# Patient Record
Sex: MALE | Race: OTHER | NOT HISPANIC OR LATINO | URBAN - METROPOLITAN AREA
[De-identification: names, ages, dates, MRNs, and addresses within clinical notes are randomized per-mention and may not be internally consistent; named-entity substitution may affect disease eponyms.]

---

## 2017-06-12 ENCOUNTER — INPATIENT (INPATIENT)
Facility: HOSPITAL | Age: 82
LOS: 3 days | Discharge: HOME | DRG: 281 | End: 2017-06-16
Attending: HOSPITALIST | Admitting: INTERNAL MEDICINE
Payer: MEDICARE

## 2017-06-12 VITALS
RESPIRATION RATE: 16 BRPM | HEIGHT: 66 IN | OXYGEN SATURATION: 94 % | DIASTOLIC BLOOD PRESSURE: 79 MMHG | TEMPERATURE: 98 F | HEART RATE: 83 BPM | WEIGHT: 153.66 LBS | SYSTOLIC BLOOD PRESSURE: 135 MMHG

## 2017-06-12 DIAGNOSIS — I27.82 CHRONIC PULMONARY EMBOLISM: ICD-10-CM

## 2017-06-12 DIAGNOSIS — I10 ESSENTIAL (PRIMARY) HYPERTENSION: ICD-10-CM

## 2017-06-12 DIAGNOSIS — Z98.89 OTHER SPECIFIED POSTPROCEDURAL STATES: Chronic | ICD-10-CM

## 2017-06-12 DIAGNOSIS — I21.4 NON-ST ELEVATION (NSTEMI) MYOCARDIAL INFARCTION: ICD-10-CM

## 2017-06-12 DIAGNOSIS — Z90.49 ACQUIRED ABSENCE OF OTHER SPECIFIED PARTS OF DIGESTIVE TRACT: Chronic | ICD-10-CM

## 2017-06-12 DIAGNOSIS — N40.0 BENIGN PROSTATIC HYPERPLASIA WITHOUT LOWER URINARY TRACT SYMPTOMS: ICD-10-CM

## 2017-06-12 DIAGNOSIS — I25.708 ATHEROSCLEROSIS OF CORONARY ARTERY BYPASS GRAFT(S), UNSPECIFIED, WITH OTHER FORMS OF ANGINA PECTORIS: ICD-10-CM

## 2017-06-12 DIAGNOSIS — J43.9 EMPHYSEMA, UNSPECIFIED: ICD-10-CM

## 2017-06-12 DIAGNOSIS — Z29.9 ENCOUNTER FOR PROPHYLACTIC MEASURES, UNSPECIFIED: ICD-10-CM

## 2017-06-12 DIAGNOSIS — E11.8 TYPE 2 DIABETES MELLITUS WITH UNSPECIFIED COMPLICATIONS: ICD-10-CM

## 2017-06-12 LAB
ALBUMIN SERPL ELPH-MCNC: 3.7 G/DL — SIGNIFICANT CHANGE UP (ref 3.3–5)
ALP SERPL-CCNC: 48 U/L — SIGNIFICANT CHANGE UP (ref 40–120)
ALT FLD-CCNC: 17 U/L RC — SIGNIFICANT CHANGE UP (ref 10–45)
ANION GAP SERPL CALC-SCNC: 16 MMOL/L — SIGNIFICANT CHANGE UP (ref 5–17)
APTT BLD: 52.7 SEC — HIGH (ref 27.5–37.4)
AST SERPL-CCNC: 23 U/L — SIGNIFICANT CHANGE UP (ref 10–40)
BASOPHILS # BLD AUTO: 0 K/UL — SIGNIFICANT CHANGE UP (ref 0–0.2)
BASOPHILS NFR BLD AUTO: 0.3 % — SIGNIFICANT CHANGE UP (ref 0–2)
BILIRUB SERPL-MCNC: 0.8 MG/DL — SIGNIFICANT CHANGE UP (ref 0.2–1.2)
BUN SERPL-MCNC: 12 MG/DL — SIGNIFICANT CHANGE UP (ref 7–23)
CALCIUM SERPL-MCNC: 8.9 MG/DL — SIGNIFICANT CHANGE UP (ref 8.4–10.5)
CHLORIDE SERPL-SCNC: 105 MMOL/L — SIGNIFICANT CHANGE UP (ref 96–108)
CO2 SERPL-SCNC: 23 MMOL/L — SIGNIFICANT CHANGE UP (ref 22–31)
CREAT SERPL-MCNC: 0.85 MG/DL — SIGNIFICANT CHANGE UP (ref 0.5–1.3)
EOSINOPHIL # BLD AUTO: 0.1 K/UL — SIGNIFICANT CHANGE UP (ref 0–0.5)
EOSINOPHIL NFR BLD AUTO: 2.2 % — SIGNIFICANT CHANGE UP (ref 0–6)
GLUCOSE SERPL-MCNC: 101 MG/DL — HIGH (ref 70–99)
HCT VFR BLD CALC: 37.6 % — LOW (ref 39–50)
HGB BLD-MCNC: 13 G/DL — SIGNIFICANT CHANGE UP (ref 13–17)
INR BLD: 1.19 RATIO — HIGH (ref 0.88–1.16)
LYMPHOCYTES # BLD AUTO: 1.6 K/UL — SIGNIFICANT CHANGE UP (ref 1–3.3)
LYMPHOCYTES # BLD AUTO: 25.2 % — SIGNIFICANT CHANGE UP (ref 13–44)
MCHC RBC-ENTMCNC: 32.3 PG — SIGNIFICANT CHANGE UP (ref 27–34)
MCHC RBC-ENTMCNC: 34.5 GM/DL — SIGNIFICANT CHANGE UP (ref 32–36)
MCV RBC AUTO: 93.6 FL — SIGNIFICANT CHANGE UP (ref 80–100)
MONOCYTES # BLD AUTO: 0.6 K/UL — SIGNIFICANT CHANGE UP (ref 0–0.9)
MONOCYTES NFR BLD AUTO: 9.8 % — SIGNIFICANT CHANGE UP (ref 2–14)
NEUTROPHILS # BLD AUTO: 4 K/UL — SIGNIFICANT CHANGE UP (ref 1.8–7.4)
NEUTROPHILS NFR BLD AUTO: 62.4 % — SIGNIFICANT CHANGE UP (ref 43–77)
PLATELET # BLD AUTO: 213 K/UL — SIGNIFICANT CHANGE UP (ref 150–400)
POTASSIUM SERPL-MCNC: 3.4 MMOL/L — LOW (ref 3.5–5.3)
POTASSIUM SERPL-SCNC: 3.4 MMOL/L — LOW (ref 3.5–5.3)
PROT SERPL-MCNC: 6.6 G/DL — SIGNIFICANT CHANGE UP (ref 6–8.3)
PROTHROM AB SERPL-ACNC: 13 SEC — HIGH (ref 9.8–12.7)
RBC # BLD: 4.02 M/UL — LOW (ref 4.2–5.8)
RBC # FLD: 12.8 % — SIGNIFICANT CHANGE UP (ref 10.3–14.5)
SODIUM SERPL-SCNC: 144 MMOL/L — SIGNIFICANT CHANGE UP (ref 135–145)
TROPONIN T SERPL-MCNC: 0.06 NG/ML — SIGNIFICANT CHANGE UP (ref 0–0.06)
WBC # BLD: 6.4 K/UL — SIGNIFICANT CHANGE UP (ref 3.8–10.5)
WBC # FLD AUTO: 6.4 K/UL — SIGNIFICANT CHANGE UP (ref 3.8–10.5)

## 2017-06-12 PROCEDURE — 99223 1ST HOSP IP/OBS HIGH 75: CPT

## 2017-06-12 RX ORDER — MIRTAZAPINE 45 MG/1
15 TABLET, ORALLY DISINTEGRATING ORAL AT BEDTIME
Qty: 0 | Refills: 0 | Status: DISCONTINUED | OUTPATIENT
Start: 2017-06-12 | End: 2017-06-16

## 2017-06-12 RX ORDER — LOSARTAN POTASSIUM 100 MG/1
25 TABLET, FILM COATED ORAL DAILY
Qty: 0 | Refills: 0 | Status: DISCONTINUED | OUTPATIENT
Start: 2017-06-12 | End: 2017-06-16

## 2017-06-12 RX ORDER — DEXTROSE 50 % IN WATER 50 %
25 SYRINGE (ML) INTRAVENOUS ONCE
Qty: 0 | Refills: 0 | Status: DISCONTINUED | OUTPATIENT
Start: 2017-06-12 | End: 2017-06-16

## 2017-06-12 RX ORDER — METOPROLOL TARTRATE 50 MG
25 TABLET ORAL DAILY
Qty: 0 | Refills: 0 | Status: DISCONTINUED | OUTPATIENT
Start: 2017-06-12 | End: 2017-06-16

## 2017-06-12 RX ORDER — GLUCAGON INJECTION, SOLUTION 0.5 MG/.1ML
1 INJECTION, SOLUTION SUBCUTANEOUS ONCE
Qty: 0 | Refills: 0 | Status: DISCONTINUED | OUTPATIENT
Start: 2017-06-12 | End: 2017-06-16

## 2017-06-12 RX ORDER — ATORVASTATIN CALCIUM 80 MG/1
40 TABLET, FILM COATED ORAL AT BEDTIME
Qty: 0 | Refills: 0 | Status: DISCONTINUED | OUTPATIENT
Start: 2017-06-12 | End: 2017-06-16

## 2017-06-12 RX ORDER — TAMSULOSIN HYDROCHLORIDE 0.4 MG/1
0.4 CAPSULE ORAL AT BEDTIME
Qty: 0 | Refills: 0 | Status: DISCONTINUED | OUTPATIENT
Start: 2017-06-12 | End: 2017-06-16

## 2017-06-12 RX ORDER — LUBIPROSTONE 24 UG/1
24 CAPSULE, GELATIN COATED ORAL
Qty: 0 | Refills: 0 | Status: DISCONTINUED | OUTPATIENT
Start: 2017-06-12 | End: 2017-06-16

## 2017-06-12 RX ORDER — TIOTROPIUM BROMIDE 18 UG/1
1 CAPSULE ORAL; RESPIRATORY (INHALATION)
Qty: 0 | Refills: 0 | COMMUNITY

## 2017-06-12 RX ORDER — LEVOTHYROXINE SODIUM 125 MCG
112 TABLET ORAL DAILY
Qty: 0 | Refills: 0 | Status: DISCONTINUED | OUTPATIENT
Start: 2017-06-12 | End: 2017-06-16

## 2017-06-12 RX ORDER — SODIUM CHLORIDE 9 MG/ML
1000 INJECTION, SOLUTION INTRAVENOUS
Qty: 0 | Refills: 0 | Status: DISCONTINUED | OUTPATIENT
Start: 2017-06-12 | End: 2017-06-16

## 2017-06-12 RX ORDER — ISOSORBIDE MONONITRATE 60 MG/1
60 TABLET, EXTENDED RELEASE ORAL DAILY
Qty: 0 | Refills: 0 | Status: DISCONTINUED | OUTPATIENT
Start: 2017-06-12 | End: 2017-06-16

## 2017-06-12 RX ORDER — DOCUSATE SODIUM 100 MG
100 CAPSULE ORAL THREE TIMES A DAY
Qty: 0 | Refills: 0 | Status: DISCONTINUED | OUTPATIENT
Start: 2017-06-12 | End: 2017-06-16

## 2017-06-12 RX ORDER — INSULIN LISPRO 100/ML
VIAL (ML) SUBCUTANEOUS AT BEDTIME
Qty: 0 | Refills: 0 | Status: DISCONTINUED | OUTPATIENT
Start: 2017-06-12 | End: 2017-06-16

## 2017-06-12 RX ORDER — ASPIRIN/CALCIUM CARB/MAGNESIUM 324 MG
81 TABLET ORAL DAILY
Qty: 0 | Refills: 0 | Status: DISCONTINUED | OUTPATIENT
Start: 2017-06-12 | End: 2017-06-16

## 2017-06-12 RX ORDER — HEPARIN SODIUM 5000 [USP'U]/ML
4100 INJECTION INTRAVENOUS; SUBCUTANEOUS EVERY 6 HOURS
Qty: 0 | Refills: 0 | Status: DISCONTINUED | OUTPATIENT
Start: 2017-06-12 | End: 2017-06-14

## 2017-06-12 RX ORDER — INSULIN LISPRO 100/ML
VIAL (ML) SUBCUTANEOUS
Qty: 0 | Refills: 0 | Status: DISCONTINUED | OUTPATIENT
Start: 2017-06-12 | End: 2017-06-16

## 2017-06-12 RX ORDER — DEXTROSE 50 % IN WATER 50 %
1 SYRINGE (ML) INTRAVENOUS ONCE
Qty: 0 | Refills: 0 | Status: DISCONTINUED | OUTPATIENT
Start: 2017-06-12 | End: 2017-06-16

## 2017-06-12 RX ORDER — DEXTROSE 50 % IN WATER 50 %
12.5 SYRINGE (ML) INTRAVENOUS ONCE
Qty: 0 | Refills: 0 | Status: DISCONTINUED | OUTPATIENT
Start: 2017-06-12 | End: 2017-06-16

## 2017-06-12 RX ORDER — HEPARIN SODIUM 5000 [USP'U]/ML
INJECTION INTRAVENOUS; SUBCUTANEOUS
Qty: 25000 | Refills: 0 | Status: DISCONTINUED | OUTPATIENT
Start: 2017-06-12 | End: 2017-06-14

## 2017-06-12 RX ORDER — CLOPIDOGREL BISULFATE 75 MG/1
75 TABLET, FILM COATED ORAL DAILY
Qty: 0 | Refills: 0 | Status: DISCONTINUED | OUTPATIENT
Start: 2017-06-12 | End: 2017-06-15

## 2017-06-12 RX ADMIN — ATORVASTATIN CALCIUM 40 MILLIGRAM(S): 80 TABLET, FILM COATED ORAL at 23:14

## 2017-06-12 RX ADMIN — HEPARIN SODIUM 800 UNIT(S)/HR: 5000 INJECTION INTRAVENOUS; SUBCUTANEOUS at 22:10

## 2017-06-12 RX ADMIN — LUBIPROSTONE 24 MICROGRAM(S): 24 CAPSULE, GELATIN COATED ORAL at 23:12

## 2017-06-12 RX ADMIN — Medication 100 MILLIGRAM(S): at 23:16

## 2017-06-12 RX ADMIN — MIRTAZAPINE 15 MILLIGRAM(S): 45 TABLET, ORALLY DISINTEGRATING ORAL at 23:12
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
## 2017-06-12 NOTE — H&P ADULT - FAMILY HISTORY
Father  Still living? Unknown  Family history of premature CAD, Age at diagnosis: Age Unknown     Mother  Still living? Unknown  Family history of premature CAD, Age at diagnosis: Age Unknown

## 2017-06-12 NOTE — H&P ADULT - PROBLEM SELECTOR PROBLEM 6
Essential hypertension Benign prostatic hyperplasia, presence of lower urinary tract symptoms unspecified, unspecified morphology

## 2017-06-12 NOTE — H&P ADULT - PROBLEM SELECTOR PLAN 2
S/p 7 MARILEE as per daughter. Reportedly one blocked. S/p CABG 2007.  -Cont. ASA and plavix  -Cont. lipitor  -See above  -Cont. Toprol  -Cont. Losartan

## 2017-06-12 NOTE — H&P ADULT - PMH
Asthma  dx 1996, no intubations or hospitalizations  BPH (benign prostatic hyperplasia)    CAD (coronary artery disease)  s/p PCI and CABG  Cataract, bilateral    Colon cancer  polyp removed  Diabetes    HTN (hypertension)    MI (myocardial infarction)    Nephrolithiasis
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
## 2017-06-12 NOTE — H&P ADULT - PROBLEM SELECTOR PROBLEM 4
Type 2 diabetes mellitus with complication, without long-term current use of insulin Pulmonary emphysema, unspecified emphysema type

## 2017-06-12 NOTE — H&P ADULT - PROBLEM SELECTOR PLAN 1
Pt with elevated cardiac enzymes to 0.2 at outside hospital. Currently chest pain free. Case discussed with cardiology fellow. plans for tentative cath in AM  -Cont. asa and plavix  -Cont. lipitor  -TTE  -Telemetry  -Heparin gtt for ACS  -F/u further cardiology recommendations  -Will cont. home imdur for now

## 2017-06-12 NOTE — H&P ADULT - NSHPSOCIALHISTORY_GEN_ALL_CORE
Patient lives at home, denies recent tobacco but used to smoke in past. Denies illicit substance use.

## 2017-06-12 NOTE — H&P ADULT - PROBLEM SELECTOR PROBLEM 5
Benign prostatic hyperplasia, presence of lower urinary tract symptoms unspecified, unspecified morphology Type 2 diabetes mellitus with complication, without long-term current use of insulin

## 2017-06-12 NOTE — H&P ADULT - ASSESSMENT
81M CAD s/p 7 MARILEE, CABG 2007, COPD, recent PE on xarelto, L facial melanoma s/p keytruda, HTN, HLD p/w NSTEMI

## 2017-06-12 NOTE — H&P ADULT - PROBLEM SELECTOR PROBLEM 3
Pulmonary emphysema, unspecified emphysema type Other chronic pulmonary embolism without acute cor pulmonale

## 2017-06-13 DIAGNOSIS — K12.33 ORAL MUCOSITIS (ULCERATIVE) DUE TO RADIATION: ICD-10-CM

## 2017-06-13 DIAGNOSIS — C43.9 MALIGNANT MELANOMA OF SKIN, UNSPECIFIED: ICD-10-CM

## 2017-06-13 LAB
ANION GAP SERPL CALC-SCNC: 14 MMOL/L — SIGNIFICANT CHANGE UP (ref 5–17)
APTT BLD: 44.5 SEC — HIGH (ref 27.5–37.4)
APTT BLD: 52.9 SEC — HIGH (ref 27.5–37.4)
APTT BLD: 61.9 SEC — HIGH (ref 27.5–37.4)
BUN SERPL-MCNC: 11 MG/DL — SIGNIFICANT CHANGE UP (ref 7–23)
CALCIUM SERPL-MCNC: 8.9 MG/DL — SIGNIFICANT CHANGE UP (ref 8.4–10.5)
CHLORIDE SERPL-SCNC: 108 MMOL/L — SIGNIFICANT CHANGE UP (ref 96–108)
CO2 SERPL-SCNC: 24 MMOL/L — SIGNIFICANT CHANGE UP (ref 22–31)
CREAT SERPL-MCNC: 0.76 MG/DL — SIGNIFICANT CHANGE UP (ref 0.5–1.3)
GLUCOSE SERPL-MCNC: 95 MG/DL — SIGNIFICANT CHANGE UP (ref 70–99)
HBA1C BLD-MCNC: 6 % — HIGH (ref 4–5.6)
HCT VFR BLD CALC: 39 % — SIGNIFICANT CHANGE UP (ref 39–50)
HGB BLD-MCNC: 12.9 G/DL — LOW (ref 13–17)
MAGNESIUM SERPL-MCNC: 1.9 MG/DL — SIGNIFICANT CHANGE UP (ref 1.6–2.6)
MCHC RBC-ENTMCNC: 31 PG — SIGNIFICANT CHANGE UP (ref 27–34)
MCHC RBC-ENTMCNC: 33 GM/DL — SIGNIFICANT CHANGE UP (ref 32–36)
MCV RBC AUTO: 93.8 FL — SIGNIFICANT CHANGE UP (ref 80–100)
NT-PROBNP SERPL-SCNC: 1924 PG/ML — HIGH (ref 0–300)
PLATELET # BLD AUTO: 214 K/UL — SIGNIFICANT CHANGE UP (ref 150–400)
POTASSIUM SERPL-MCNC: 3.3 MMOL/L — LOW (ref 3.5–5.3)
POTASSIUM SERPL-SCNC: 3.3 MMOL/L — LOW (ref 3.5–5.3)
RBC # BLD: 4.16 M/UL — LOW (ref 4.2–5.8)
RBC # FLD: 12.8 % — SIGNIFICANT CHANGE UP (ref 10.3–14.5)
SODIUM SERPL-SCNC: 146 MMOL/L — HIGH (ref 135–145)
TROPONIN T SERPL-MCNC: 0.06 NG/ML — SIGNIFICANT CHANGE UP (ref 0–0.06)
WBC # BLD: 8.6 K/UL — SIGNIFICANT CHANGE UP (ref 3.8–10.5)
WBC # FLD AUTO: 8.6 K/UL — SIGNIFICANT CHANGE UP (ref 3.8–10.5)

## 2017-06-13 PROCEDURE — 99233 SBSQ HOSP IP/OBS HIGH 50: CPT

## 2017-06-13 PROCEDURE — 93010 ELECTROCARDIOGRAM REPORT: CPT | Mod: 77

## 2017-06-13 PROCEDURE — 99222 1ST HOSP IP/OBS MODERATE 55: CPT

## 2017-06-13 PROCEDURE — 93010 ELECTROCARDIOGRAM REPORT: CPT

## 2017-06-13 PROCEDURE — 93306 TTE W/DOPPLER COMPLETE: CPT | Mod: 26

## 2017-06-13 PROCEDURE — 99223 1ST HOSP IP/OBS HIGH 75: CPT | Mod: GC

## 2017-06-13 RX ORDER — POTASSIUM CHLORIDE 20 MEQ
40 PACKET (EA) ORAL ONCE
Qty: 0 | Refills: 0 | Status: COMPLETED | OUTPATIENT
Start: 2017-06-13 | End: 2017-06-13

## 2017-06-13 RX ORDER — SALIVA SUBSTITUTE COMB NO.11 351 MG
30 POWDER IN PACKET (EA) MUCOUS MEMBRANE THREE TIMES A DAY
Qty: 0 | Refills: 0 | Status: DISCONTINUED | OUTPATIENT
Start: 2017-06-13 | End: 2017-06-16

## 2017-06-13 RX ORDER — BACITRACIN ZINC 500 UNIT/G
1 OINTMENT IN PACKET (EA) TOPICAL
Qty: 0 | Refills: 0 | Status: DISCONTINUED | OUTPATIENT
Start: 2017-06-13 | End: 2017-06-16

## 2017-06-13 RX ORDER — METHYLPREDNISOLONE 4 MG
500 TABLET ORAL ONCE
Qty: 0 | Refills: 0 | Status: COMPLETED | OUTPATIENT
Start: 2017-06-13 | End: 2017-06-13

## 2017-06-13 RX ORDER — DIPHENHYDRAMINE HYDROCHLORIDE AND LIDOCAINE HYDROCHLORIDE AND ALUMINUM HYDROXIDE AND MAGNESIUM HYDRO
10 KIT
Qty: 0 | Refills: 0 | Status: DISCONTINUED | OUTPATIENT
Start: 2017-06-13 | End: 2017-06-16

## 2017-06-13 RX ORDER — POTASSIUM CHLORIDE 20 MEQ
40 PACKET (EA) ORAL ONCE
Qty: 0 | Refills: 0 | Status: DISCONTINUED | OUTPATIENT
Start: 2017-06-13 | End: 2017-06-13

## 2017-06-13 RX ADMIN — Medication 40 MILLIGRAM(S): at 21:02

## 2017-06-13 RX ADMIN — Medication 30 MILLILITER(S): at 11:53

## 2017-06-13 RX ADMIN — MIRTAZAPINE 15 MILLIGRAM(S): 45 TABLET, ORALLY DISINTEGRATING ORAL at 21:01

## 2017-06-13 RX ADMIN — DIPHENHYDRAMINE HYDROCHLORIDE AND LIDOCAINE HYDROCHLORIDE AND ALUMINUM HYDROXIDE AND MAGNESIUM HYDRO 10 MILLILITER(S): KIT at 17:25

## 2017-06-13 RX ADMIN — Medication 40 MILLIEQUIVALENT(S): at 09:10

## 2017-06-13 RX ADMIN — LOSARTAN POTASSIUM 25 MILLIGRAM(S): 100 TABLET, FILM COATED ORAL at 05:31

## 2017-06-13 RX ADMIN — Medication 30 MILLILITER(S): at 21:01

## 2017-06-13 RX ADMIN — DIPHENHYDRAMINE HYDROCHLORIDE AND LIDOCAINE HYDROCHLORIDE AND ALUMINUM HYDROXIDE AND MAGNESIUM HYDRO 10 MILLILITER(S): KIT at 11:53

## 2017-06-13 RX ADMIN — LUBIPROSTONE 24 MICROGRAM(S): 24 CAPSULE, GELATIN COATED ORAL at 05:30

## 2017-06-13 RX ADMIN — CLOPIDOGREL BISULFATE 75 MILLIGRAM(S): 75 TABLET, FILM COATED ORAL at 11:53

## 2017-06-13 RX ADMIN — TAMSULOSIN HYDROCHLORIDE 0.4 MILLIGRAM(S): 0.4 CAPSULE ORAL at 21:01

## 2017-06-13 RX ADMIN — Medication 112 MICROGRAM(S): at 05:30

## 2017-06-13 RX ADMIN — HEPARIN SODIUM 950 UNIT(S)/HR: 5000 INJECTION INTRAVENOUS; SUBCUTANEOUS at 12:17

## 2017-06-13 RX ADMIN — ISOSORBIDE MONONITRATE 60 MILLIGRAM(S): 60 TABLET, EXTENDED RELEASE ORAL at 11:53

## 2017-06-13 RX ADMIN — ATORVASTATIN CALCIUM 40 MILLIGRAM(S): 80 TABLET, FILM COATED ORAL at 21:01

## 2017-06-13 RX ADMIN — Medication 81 MILLIGRAM(S): at 11:53

## 2017-06-13 RX ADMIN — Medication 100 MILLIGRAM(S): at 21:01

## 2017-06-13 RX ADMIN — Medication 1 APPLICATION(S): at 17:25

## 2017-06-13 RX ADMIN — LUBIPROSTONE 24 MICROGRAM(S): 24 CAPSULE, GELATIN COATED ORAL at 17:24

## 2017-06-13 RX ADMIN — HEPARIN SODIUM 950 UNIT(S)/HR: 5000 INJECTION INTRAVENOUS; SUBCUTANEOUS at 05:26

## 2017-06-13 RX ADMIN — Medication 100 MILLIGRAM(S): at 05:31

## 2017-06-13 RX ADMIN — Medication 25 MILLIGRAM(S): at 05:30

## 2017-06-13 RX ADMIN — HEPARIN SODIUM 950 UNIT(S)/HR: 5000 INJECTION INTRAVENOUS; SUBCUTANEOUS at 19:27

## 2017-06-13 RX ADMIN — Medication 500 MILLIGRAM(S): at 17:26

## 2017-06-13 RX ADMIN — Medication 40 MILLIEQUIVALENT(S): at 18:02

## 2017-06-13 NOTE — CONSULT NOTE ADULT - PROBLEM SELECTOR RECOMMENDATION 9
Magic mouthwash  F/U scans from previous hospital Magic mouthwash  F/U scans from previous hospital to f/u status of melanoma

## 2017-06-13 NOTE — PROGRESS NOTE ADULT - PROBLEM SELECTOR PLAN 1
discussed with Dr Keyes.  Plan for cath tomorrow.  Dose prednisone 40mg at 9pm tonight.  -Cont. asa and plavix  -Cont. lipitor  -TTE  -Telemetry  -Heparin gtt for ACS  -F/u further cardiology recommendations  -Will cont. home imdur, toprol for now

## 2017-06-13 NOTE — PROGRESS NOTE ADULT - ASSESSMENT
82yo M pmh CAD s/p 7 MARILEE, CABG 2007, COPD (quit tobacco 30 years ago), recent PE (~3 months ago) on xarelto due to melanoma, L facial melanoma s/p keytruda (currently in remission), HTN, HLD p/w NSTEMI plan for cath tomorrow.

## 2017-06-13 NOTE — CHART NOTE - NSCHARTNOTEFT_GEN_A_CORE
patient is medically cleared for bedside aarraignment cardiology Dr. Vyas  SW involved with case as well

## 2017-06-13 NOTE — PROGRESS NOTE ADULT - PROBLEM SELECTOR PLAN 3
On xarelto at home  -Cont. heparin gtt for now  -plan to transition back to xarelto after cath as patient has been on prolonged treatment in setting of malignancy
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
## 2017-06-14 DIAGNOSIS — D29.1 BENIGN NEOPLASM OF PROSTATE: Chronic | ICD-10-CM

## 2017-06-14 DIAGNOSIS — K12.0 RECURRENT ORAL APHTHAE: ICD-10-CM

## 2017-06-14 DIAGNOSIS — R04.0 EPISTAXIS: ICD-10-CM

## 2017-06-14 LAB
ANION GAP SERPL CALC-SCNC: 15 MMOL/L — SIGNIFICANT CHANGE UP (ref 5–17)
APTT BLD: 44.8 SEC — HIGH (ref 27.5–37.4)
BUN SERPL-MCNC: 17 MG/DL — SIGNIFICANT CHANGE UP (ref 7–23)
CALCIUM SERPL-MCNC: 9.3 MG/DL — SIGNIFICANT CHANGE UP (ref 8.4–10.5)
CHLORIDE SERPL-SCNC: 105 MMOL/L — SIGNIFICANT CHANGE UP (ref 96–108)
CO2 SERPL-SCNC: 21 MMOL/L — LOW (ref 22–31)
CREAT SERPL-MCNC: 0.93 MG/DL — SIGNIFICANT CHANGE UP (ref 0.5–1.3)
GLUCOSE SERPL-MCNC: 161 MG/DL — HIGH (ref 70–99)
HCT VFR BLD CALC: 39.4 % — SIGNIFICANT CHANGE UP (ref 39–50)
HGB BLD-MCNC: 13.1 G/DL — SIGNIFICANT CHANGE UP (ref 13–17)
MAGNESIUM SERPL-MCNC: 2.2 MG/DL — SIGNIFICANT CHANGE UP (ref 1.6–2.6)
MCHC RBC-ENTMCNC: 30.3 PG — SIGNIFICANT CHANGE UP (ref 27–34)
MCHC RBC-ENTMCNC: 33.2 GM/DL — SIGNIFICANT CHANGE UP (ref 32–36)
MCV RBC AUTO: 91 FL — SIGNIFICANT CHANGE UP (ref 80–100)
PHOSPHATE SERPL-MCNC: 3.6 MG/DL — SIGNIFICANT CHANGE UP (ref 2.5–4.5)
PLATELET # BLD AUTO: 238 K/UL — SIGNIFICANT CHANGE UP (ref 150–400)
POTASSIUM SERPL-MCNC: 4.4 MMOL/L — SIGNIFICANT CHANGE UP (ref 3.5–5.3)
POTASSIUM SERPL-SCNC: 4.4 MMOL/L — SIGNIFICANT CHANGE UP (ref 3.5–5.3)
RBC # BLD: 4.33 M/UL — SIGNIFICANT CHANGE UP (ref 4.2–5.8)
RBC # FLD: 14.7 % — HIGH (ref 10.3–14.5)
SODIUM SERPL-SCNC: 141 MMOL/L — SIGNIFICANT CHANGE UP (ref 135–145)
WBC # BLD: 6.54 K/UL — SIGNIFICANT CHANGE UP (ref 3.8–10.5)
WBC # FLD AUTO: 6.54 K/UL — SIGNIFICANT CHANGE UP (ref 3.8–10.5)

## 2017-06-14 PROCEDURE — 99231 SBSQ HOSP IP/OBS SF/LOW 25: CPT

## 2017-06-14 PROCEDURE — 93459 L HRT ART/GRFT ANGIO: CPT | Mod: 26

## 2017-06-14 PROCEDURE — 99232 SBSQ HOSP IP/OBS MODERATE 35: CPT | Mod: GC

## 2017-06-14 PROCEDURE — 99233 SBSQ HOSP IP/OBS HIGH 50: CPT

## 2017-06-14 RX ORDER — DIPHENHYDRAMINE HCL 50 MG
50 CAPSULE ORAL ONCE
Qty: 0 | Refills: 0 | Status: DISCONTINUED | OUTPATIENT
Start: 2017-06-14 | End: 2017-06-16

## 2017-06-14 RX ORDER — DIPHENHYDRAMINE HCL 50 MG
50 CAPSULE ORAL ONCE
Qty: 0 | Refills: 0 | Status: COMPLETED | OUTPATIENT
Start: 2017-06-14 | End: 2017-06-14

## 2017-06-14 RX ORDER — HEPARIN SODIUM 5000 [USP'U]/ML
5500 INJECTION INTRAVENOUS; SUBCUTANEOUS EVERY 6 HOURS
Qty: 0 | Refills: 0 | Status: DISCONTINUED | OUTPATIENT
Start: 2017-06-14 | End: 2017-06-15

## 2017-06-14 RX ORDER — HEPARIN SODIUM 5000 [USP'U]/ML
1100 INJECTION INTRAVENOUS; SUBCUTANEOUS
Qty: 25000 | Refills: 0 | Status: DISCONTINUED | OUTPATIENT
Start: 2017-06-14 | End: 2017-06-15

## 2017-06-14 RX ORDER — HEPARIN SODIUM 5000 [USP'U]/ML
2500 INJECTION INTRAVENOUS; SUBCUTANEOUS EVERY 6 HOURS
Qty: 0 | Refills: 0 | Status: DISCONTINUED | OUTPATIENT
Start: 2017-06-14 | End: 2017-06-15

## 2017-06-14 RX ADMIN — Medication 25 MILLIGRAM(S): at 05:35

## 2017-06-14 RX ADMIN — HEPARIN SODIUM 1100 UNIT(S)/HR: 5000 INJECTION INTRAVENOUS; SUBCUTANEOUS at 08:08

## 2017-06-14 RX ADMIN — Medication 50 MILLIGRAM(S): at 07:58

## 2017-06-14 RX ADMIN — Medication 112 MICROGRAM(S): at 05:35

## 2017-06-14 RX ADMIN — Medication 30 MILLILITER(S): at 20:00

## 2017-06-14 RX ADMIN — Medication 4: at 17:26

## 2017-06-14 RX ADMIN — Medication 50 MILLIGRAM(S): at 13:00

## 2017-06-14 RX ADMIN — Medication 100 MILLIGRAM(S): at 05:35

## 2017-06-14 RX ADMIN — Medication 30 MILLILITER(S): at 13:00

## 2017-06-14 RX ADMIN — LUBIPROSTONE 24 MICROGRAM(S): 24 CAPSULE, GELATIN COATED ORAL at 05:35

## 2017-06-14 RX ADMIN — Medication 1 APPLICATION(S): at 17:22

## 2017-06-14 RX ADMIN — HEPARIN SODIUM 1100 UNIT(S)/HR: 5000 INJECTION INTRAVENOUS; SUBCUTANEOUS at 20:00

## 2017-06-14 RX ADMIN — Medication 100 MILLIGRAM(S): at 13:00

## 2017-06-14 RX ADMIN — Medication 100 MILLIGRAM(S): at 22:02

## 2017-06-14 RX ADMIN — ATORVASTATIN CALCIUM 40 MILLIGRAM(S): 80 TABLET, FILM COATED ORAL at 22:02

## 2017-06-14 RX ADMIN — MIRTAZAPINE 15 MILLIGRAM(S): 45 TABLET, ORALLY DISINTEGRATING ORAL at 22:02

## 2017-06-14 RX ADMIN — CLOPIDOGREL BISULFATE 75 MILLIGRAM(S): 75 TABLET, FILM COATED ORAL at 12:22

## 2017-06-14 RX ADMIN — Medication 1 APPLICATION(S): at 05:12

## 2017-06-14 RX ADMIN — ISOSORBIDE MONONITRATE 60 MILLIGRAM(S): 60 TABLET, EXTENDED RELEASE ORAL at 12:25

## 2017-06-14 RX ADMIN — Medication 30 MILLILITER(S): at 08:05

## 2017-06-14 RX ADMIN — TAMSULOSIN HYDROCHLORIDE 0.4 MILLIGRAM(S): 0.4 CAPSULE ORAL at 22:02

## 2017-06-14 RX ADMIN — LUBIPROSTONE 24 MICROGRAM(S): 24 CAPSULE, GELATIN COATED ORAL at 17:22

## 2017-06-14 RX ADMIN — Medication 50 MILLIGRAM(S): at 14:00

## 2017-06-14 RX ADMIN — LOSARTAN POTASSIUM 25 MILLIGRAM(S): 100 TABLET, FILM COATED ORAL at 05:35

## 2017-06-14 RX ADMIN — Medication 81 MILLIGRAM(S): at 12:22

## 2017-06-14 NOTE — PROGRESS NOTE ADULT - ASSESSMENT
82yo M pmh CAD s/p 7 MARILEE, CABG 2007, COPD (quit tobacco 30 years ago), recent PE (~3 months ago) on xarelto due to melanoma, L facial melanoma s/p keytruda (currently in remission), HTN, HLD p/w NSTEMI plan for cath today.

## 2017-06-14 NOTE — PROGRESS NOTE ADULT - PROBLEM SELECTOR PLAN 1
Plan for cath today  premedicated with prednisone for reported contrast allergy  -Cont. asa and plavix  -Cont. lipitor  -TTE shows grossly preserved EF  -Telemetry  -Heparin gtt for ACS  -F/u further cardiology recommendations  -Will cont. home imdur, toprol for now  -f/u cardiology recs after cath
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
## 2017-06-14 NOTE — PROGRESS NOTE ADULT - SUBJECTIVE AND OBJECTIVE BOX
Patient is a 83y old  Male who presents with a chief complaint of complains of right abdomen pain (13 Jun 2017 06:37)      SUBJECTIVE / OVERNIGHT EVENTS:  reports sob with exertion, no chest pain  reports ongoing mouth pain from ulcer and epistaxis    telemetry reviewed: sinus , pvc/couplets,  for 15 seconds    MEDICATIONS  (STANDING):  insulin lispro (HumaLOG) corrective regimen sliding scale  SubCutaneous three times a day before meals  insulin lispro (HumaLOG) corrective regimen sliding scale  SubCutaneous at bedtime  dextrose 5%. 1000milliLiter(s) IV Continuous <Continuous>  dextrose 50% Injectable 12.5Gram(s) IV Push once  dextrose 50% Injectable 25Gram(s) IV Push once  dextrose 50% Injectable 25Gram(s) IV Push once  lubiprostone 24MICROGram(s) Oral two times a day  losartan 25milliGRAM(s) Oral daily  mirtazapine 15milliGRAM(s) Oral at bedtime  atorvastatin 40milliGRAM(s) Oral at bedtime  metoprolol succinate ER 25milliGRAM(s) Oral daily  docusate sodium 100milliGRAM(s) Oral three times a day  isosorbide   mononitrate ER Tablet (IMDUR) 60milliGRAM(s) Oral daily  levothyroxine 112MICROGram(s) Oral daily  aspirin enteric coated 81milliGRAM(s) Oral daily  clopidogrel Tablet 75milliGRAM(s) Oral daily  heparin  Infusion. Unit(s)/Hr IV Continuous <Continuous>  tamsulosin 0.4milliGRAM(s) Oral at bedtime  BACItracin   Ointment 1Application(s) Topical two times a day  Saliva Substitute (CAPHOSOL) 30milliLiter(s) Swish and Spit three times a day    MEDICATIONS  (PRN):  dextrose Gel 1Dose(s) Oral once PRN Blood Glucose LESS THAN 70 milliGRAM(s)/deciliter  glucagon  Injectable 1milliGRAM(s) IntraMuscular once PRN Glucose LESS THAN 70 milligrams/deciliter  heparin  Injectable 4100Unit(s) IV Push every 6 hours PRN For aPTT less than 40  FIRST- Mouthwash  BLM 10milliLiter(s) Swish and Spit every 2 hours PRN mouth pain  diphenhydrAMINE   Injectable 50milliGRAM(s) IV Push once PRN Rash and/or Itching      Vital Signs Last 24 Hrs  T(C): 36.7, Max: 36.7 (06-13 @ 23:42)  HR: 75 (75 - 86)  BP: 112/65 (108/62 - 123/70)  RR: 18 (18 - 18)  SpO2: 96% (95% - 98%)  Wt(kg): --  CAPILLARY BLOOD GLUCOSE  143 (14 Jun 2017 11:29)  138 (14 Jun 2017 07:22)  108 (13 Jun 2017 21:28)  96 (13 Jun 2017 17:12)    I&O's Summary  I & Os for 24h ending 14 Jun 2017 07:00  =============================================  IN: 802.2 ml / OUT: 1000 ml / NET: -197.8 ml    I & Os for current day (as of 14 Jun 2017 16:14)  =============================================  IN: 360 ml / OUT: 400 ml / NET: -40 ml      PHYSICAL EXAM:  GENERAL: NAD, well-developed  HEAD:  Atraumatic, Normocephalic  EYES: EOMI, conjunctiva and sclera clear  MOUTH: Large aphtous ulcers left buccal mucosa, tender, no bleeding; no thrush  NECK: Supple, No JVD  CHEST/LUNG: Clear to auscultation bilaterally; No wheeze  HEART: Regular rate and rhythm; No murmurs, rubs, or gallops  ABDOMEN: Soft, Nontender, Nondistended; Bowel sounds present  EXTREMITIES:  2+ Peripheral Pulses, No clubbing, cyanosis, or edema  PSYCH: AAOx3  NEUROLOGY: non-focal      LABS:                        13.1   6.54  )-----------( 238      ( 14 Jun 2017 07:38 )             39.4     06-14    141  |  105  |  17  ----------------------------<  161<H>  4.4   |  21<L>  |  0.93    Ca    9.3      14 Jun 2017 07:36  Phos  3.6     06-14  Mg     2.2     06-14    TPro  6.6  /  Alb  3.7  /  TBili  0.8  /  DBili  x   /  AST  23  /  ALT  17  /  AlkPhos  48  06-12    PT/INR - ( 12 Jun 2017 20:38 )   PT: 13.0 sec;   INR: 1.19 ratio         PTT - ( 14 Jun 2017 07:38 )  PTT:44.8 sec  CARDIAC MARKERS ( 13 Jun 2017 04:10 )  x     / 0.06 ng/mL / x     / x     / x      CARDIAC MARKERS ( 12 Jun 2017 20:38 )  x     / 0.06 ng/mL / x     / x     / x              RADIOLOGY & ADDITIONAL TESTS:  TTE:  1. Calcified trileaflet aortic valve with normal opening.  Mild aortic regurgitation.  2. Endocardium not well visualized; grossly prserved left  ventricular systolic function. Paradoxical septal motion  consistent with prior sternotomy. Cannot rule out wall  motion abnormalities.  3. The right ventricle is not well visualized.  4. No pericardial effusion seen.    Consultant(s) Notes Reviewed:  ENT    Care Discussed with Consultants/Other Providers:

## 2017-06-14 NOTE — PROGRESS NOTE ADULT - SUBJECTIVE AND OBJECTIVE BOX
POD/STATUS POST/ENT ISSUE: Mucositis    INTERVAL HPI: 82 yo M mucositis S/P RT for melanonoma C/O left R. sided nose bleed since this AM and oral pain    Vital Signs Last 24 Hrs  T(C): 36.7, Max: 36.7 (06-13 @ 12:28)  T(F): 98.1, Max: 98.1 (06-14 @ 04:53)  HR: 85 (83 - 96)  BP: 123/70 (110/66 - 123/73)  BP(mean): --  RR: 18 (18 - 18)  SpO2: 96% (96% - 98%)    PHYSICAL EXAM:  Gen: NAD, well-developed  Head: Normocephalic, Atraumatic  Eyes: PERRL, EOMI, no scleral injection  Nose: actively bleeding from R. nare, small amount of surgicel used to control bleeding, L. nare dry , patent, no pus  Mouth: Large aphtous ulcers noted on left buccal mucosa, tender, no bleeding, mild soft palate erythema. no edema, uvula midline  Neck: Flat, supple, no lymphadenopathy, trachea midline, no masses  Resp: breathing easily, no stridor  CV: no peripheral edema/cyanosis    LABS:                        13.1   6.54  )-----------( 238      ( 14 Jun 2017 07:38 )             39.4       IMAGING/ADDITIONAL STUDIES:

## 2017-06-15 ENCOUNTER — TRANSCRIPTION ENCOUNTER (OUTPATIENT)
Age: 82
End: 2017-06-15

## 2017-06-15 DIAGNOSIS — I24.8 OTHER FORMS OF ACUTE ISCHEMIC HEART DISEASE: ICD-10-CM

## 2017-06-15 DIAGNOSIS — R04.0 EPISTAXIS: ICD-10-CM

## 2017-06-15 LAB
ANION GAP SERPL CALC-SCNC: 14 MMOL/L — SIGNIFICANT CHANGE UP (ref 5–17)
APTT BLD: 120.9 SEC — CRITICAL HIGH (ref 27.5–37.4)
APTT BLD: 57.1 SEC — HIGH (ref 27.5–37.4)
BUN SERPL-MCNC: 26 MG/DL — HIGH (ref 7–23)
CALCIUM SERPL-MCNC: 8.5 MG/DL — SIGNIFICANT CHANGE UP (ref 8.4–10.5)
CHLORIDE SERPL-SCNC: 104 MMOL/L — SIGNIFICANT CHANGE UP (ref 96–108)
CO2 SERPL-SCNC: 20 MMOL/L — LOW (ref 22–31)
CREAT SERPL-MCNC: 0.92 MG/DL — SIGNIFICANT CHANGE UP (ref 0.5–1.3)
GLUCOSE SERPL-MCNC: 130 MG/DL — HIGH (ref 70–99)
HCT VFR BLD CALC: 36.5 % — LOW (ref 39–50)
HCT VFR BLD CALC: 36.9 % — LOW (ref 39–50)
HGB BLD-MCNC: 12.1 G/DL — LOW (ref 13–17)
HGB BLD-MCNC: 12.5 G/DL — LOW (ref 13–17)
MCHC RBC-ENTMCNC: 29.8 PG — SIGNIFICANT CHANGE UP (ref 27–34)
MCHC RBC-ENTMCNC: 31.8 PG — SIGNIFICANT CHANGE UP (ref 27–34)
MCHC RBC-ENTMCNC: 33.2 GM/DL — SIGNIFICANT CHANGE UP (ref 32–36)
MCHC RBC-ENTMCNC: 33.8 GM/DL — SIGNIFICANT CHANGE UP (ref 32–36)
MCV RBC AUTO: 89.9 FL — SIGNIFICANT CHANGE UP (ref 80–100)
MCV RBC AUTO: 94.1 FL — SIGNIFICANT CHANGE UP (ref 80–100)
PLATELET # BLD AUTO: 233 K/UL — SIGNIFICANT CHANGE UP (ref 150–400)
PLATELET # BLD AUTO: 246 K/UL — SIGNIFICANT CHANGE UP (ref 150–400)
POTASSIUM SERPL-MCNC: 3.6 MMOL/L — SIGNIFICANT CHANGE UP (ref 3.5–5.3)
POTASSIUM SERPL-SCNC: 3.6 MMOL/L — SIGNIFICANT CHANGE UP (ref 3.5–5.3)
RBC # BLD: 3.92 M/UL — LOW (ref 4.2–5.8)
RBC # BLD: 4.06 M/UL — LOW (ref 4.2–5.8)
RBC # FLD: 12.8 % — SIGNIFICANT CHANGE UP (ref 10.3–14.5)
RBC # FLD: 14.2 % — SIGNIFICANT CHANGE UP (ref 10.3–14.5)
SODIUM SERPL-SCNC: 138 MMOL/L — SIGNIFICANT CHANGE UP (ref 135–145)
WBC # BLD: 13.7 K/UL — HIGH (ref 3.8–10.5)
WBC # BLD: 13.8 K/UL — HIGH (ref 3.8–10.5)
WBC # FLD AUTO: 13.7 K/UL — HIGH (ref 3.8–10.5)
WBC # FLD AUTO: 13.8 K/UL — HIGH (ref 3.8–10.5)

## 2017-06-15 PROCEDURE — 99232 SBSQ HOSP IP/OBS MODERATE 35: CPT

## 2017-06-15 PROCEDURE — 99221 1ST HOSP IP/OBS SF/LOW 40: CPT

## 2017-06-15 PROCEDURE — 71111 X-RAY EXAM RIBS/CHEST4/> VWS: CPT | Mod: 26

## 2017-06-15 PROCEDURE — 70486 CT MAXILLOFACIAL W/O DYE: CPT | Mod: 26

## 2017-06-15 PROCEDURE — 99233 SBSQ HOSP IP/OBS HIGH 50: CPT

## 2017-06-15 PROCEDURE — 99232 SBSQ HOSP IP/OBS MODERATE 35: CPT | Mod: GC

## 2017-06-15 RX ORDER — SODIUM CHLORIDE 0.65 %
1 AEROSOL, SPRAY (ML) NASAL EVERY 4 HOURS
Qty: 0 | Refills: 0 | Status: DISCONTINUED | OUTPATIENT
Start: 2017-06-15 | End: 2017-06-16

## 2017-06-15 RX ORDER — SALIVA SUBSTITUTE COMB NO.11 351 MG
30 POWDER IN PACKET (EA) MUCOUS MEMBRANE
Qty: 630 | Refills: 0
Start: 2017-06-15 | End: 2017-06-22

## 2017-06-15 RX ORDER — SODIUM CHLORIDE 0.65 %
1 AEROSOL, SPRAY (ML) NASAL
Qty: 30 | Refills: 0
Start: 2017-06-15

## 2017-06-15 RX ORDER — BACITRACIN ZINC 500 UNIT/G
1 OINTMENT IN PACKET (EA) TOPICAL
Qty: 1 | Refills: 0
Start: 2017-06-15 | End: 2017-06-22

## 2017-06-15 RX ORDER — FONDAPARINUX SODIUM 2.5 MG/.5ML
1 INJECTION, SOLUTION SUBCUTANEOUS
Qty: 0 | Refills: 0 | COMMUNITY

## 2017-06-15 RX ORDER — DIPHENHYDRAMINE HYDROCHLORIDE AND LIDOCAINE HYDROCHLORIDE AND ALUMINUM HYDROXIDE AND MAGNESIUM HYDRO
10 KIT
Qty: 7 | Refills: 0
Start: 2017-06-15 | End: 2017-06-22

## 2017-06-15 RX ADMIN — TAMSULOSIN HYDROCHLORIDE 0.4 MILLIGRAM(S): 0.4 CAPSULE ORAL at 22:35

## 2017-06-15 RX ADMIN — HEPARIN SODIUM 2500 UNIT(S): 5000 INJECTION INTRAVENOUS; SUBCUTANEOUS at 02:16

## 2017-06-15 RX ADMIN — LOSARTAN POTASSIUM 25 MILLIGRAM(S): 100 TABLET, FILM COATED ORAL at 06:15

## 2017-06-15 RX ADMIN — LUBIPROSTONE 24 MICROGRAM(S): 24 CAPSULE, GELATIN COATED ORAL at 18:35

## 2017-06-15 RX ADMIN — Medication 1 APPLICATION(S): at 18:22

## 2017-06-15 RX ADMIN — Medication 2: at 11:56

## 2017-06-15 RX ADMIN — Medication 112 MICROGRAM(S): at 06:15

## 2017-06-15 RX ADMIN — Medication 30 MILLILITER(S): at 18:21

## 2017-06-15 RX ADMIN — ATORVASTATIN CALCIUM 40 MILLIGRAM(S): 80 TABLET, FILM COATED ORAL at 22:35

## 2017-06-15 RX ADMIN — Medication 100 MILLIGRAM(S): at 06:15

## 2017-06-15 RX ADMIN — HEPARIN SODIUM 1100 UNIT(S)/HR: 5000 INJECTION INTRAVENOUS; SUBCUTANEOUS at 09:48

## 2017-06-15 RX ADMIN — Medication 1 APPLICATION(S): at 06:15

## 2017-06-15 RX ADMIN — ISOSORBIDE MONONITRATE 60 MILLIGRAM(S): 60 TABLET, EXTENDED RELEASE ORAL at 18:21

## 2017-06-15 RX ADMIN — CLOPIDOGREL BISULFATE 75 MILLIGRAM(S): 75 TABLET, FILM COATED ORAL at 11:58

## 2017-06-15 RX ADMIN — Medication 30 MILLILITER(S): at 22:36

## 2017-06-15 RX ADMIN — Medication 100 MILLIGRAM(S): at 22:35

## 2017-06-15 RX ADMIN — HEPARIN SODIUM 1200 UNIT(S)/HR: 5000 INJECTION INTRAVENOUS; SUBCUTANEOUS at 02:11

## 2017-06-15 RX ADMIN — Medication 30 MILLILITER(S): at 06:15

## 2017-06-15 RX ADMIN — Medication 1 SPRAY(S): at 18:22

## 2017-06-15 RX ADMIN — Medication 81 MILLIGRAM(S): at 11:58

## 2017-06-15 RX ADMIN — LUBIPROSTONE 24 MICROGRAM(S): 24 CAPSULE, GELATIN COATED ORAL at 06:15

## 2017-06-15 RX ADMIN — MIRTAZAPINE 15 MILLIGRAM(S): 45 TABLET, ORALLY DISINTEGRATING ORAL at 22:36

## 2017-06-15 RX ADMIN — Medication 100 MILLIGRAM(S): at 18:21

## 2017-06-15 RX ADMIN — Medication 25 MILLIGRAM(S): at 06:15

## 2017-06-15 NOTE — DISCHARGE NOTE ADULT - PROVIDER TOKENS
FREE:[LAST:[Dawna],FIRST:[arlin],PHONE:[(   )    -],FAX:[(   )    -],ADDRESS:[[phone]]] FREE:[LAST:[Dawna],FIRST:[arlin],PHONE:[(   )    -],FAX:[(   )    -],ADDRESS:[[phone]]],TOKEN:'[id number]'
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
## 2017-06-15 NOTE — DISCHARGE NOTE ADULT - HOME CARE AGENCY
[facility]  [phone]  A visiting nurse will call to schedule an appointment to visit you in your home within 48-72 hours after discharge.  Physical therapy, home health aide services are also requested

## 2017-06-15 NOTE — PROGRESS NOTE ADULT - PROBLEM SELECTOR PLAN 2
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
## 2017-06-15 NOTE — PROGRESS NOTE ADULT - SUBJECTIVE AND OBJECTIVE BOX
POD/STATUS POST/ENT ISSUE:     INTERVAL HPI:     Vital Signs Last 24 Hrs  T(C): 36.4, Max: 36.7 (06-14 @ 15:50)  T(F): 97.5, Max: 98 (06-14 @ 15:50)  HR: 89 (71 - 94)  BP: 118/60 (104/63 - 144/65)  BP(mean): --  RR: 18 (15 - 19)  SpO2: 98% (95% - 98%)    PHYSICAL EXAM:  Gen: NAD, well-developed  Head: Normocephalic, Atraumatic  Eyes: PERRL, EOMI, no scleral injection  Nose: Nares bilaterally patent, no discharge  Mouth: Large apthous ulcers noted in the left buccal mucosa, tender, no bleeding, no soft palate edema  Neck: Flat, supple, no lymphadenopathy, trachea midline, no masses  Resp: breathing easily, no stridor  CV: no peripheral edema/cyanosis    LABS:                        12.5   13.7  )-----------( 246      ( 15 Moshe 2017 01:45 )             36.9       IMAGING/ADDITIONAL STUDIES: POD/STATUS POST/ENT ISSUE: Mucositis due to RT    INTERVAL HPI:     Vital Signs Last 24 Hrs  T(C): 36.4, Max: 36.7 (06-14 @ 15:50)  T(F): 97.5, Max: 98 (06-14 @ 15:50)  HR: 89 (71 - 94)  BP: 118/60 (104/63 - 144/65)  BP(mean): --  RR: 18 (15 - 19)  SpO2: 98% (95% - 98%)    PHYSICAL EXAM:  Gen: NAD, well-developed  Head: Normocephalic, Atraumatic  Eyes: PERRL, EOMI, no scleral injection  Nose: Oozing slightly from right nare, surgicel packing placed to control bleeding  Mouth: Large apthous ulcers noted in the left buccal mucosa, tender, no bleeding, no soft palate edema  Neck: Flat, supple, no lymphadenopathy, trachea midline, no masses  Resp: breathing easily, no stridor  CV: no peripheral edema/cyanosis    LABS:                        12.5   13.7  )-----------( 246      ( 15 Moshe 2017 01:45 )             36.9       IMAGING/ADDITIONAL STUDIES: POD/STATUS POST/ENT ISSUE: Mucositis due to RT    INTERVAL HPI: C/O recurrent epistaxis, oral pain    Vital Signs Last 24 Hrs  T(C): 36.4, Max: 36.7 (06-14 @ 15:50)  T(F): 97.5, Max: 98 (06-14 @ 15:50)  HR: 89 (71 - 94)  BP: 118/60 (104/63 - 144/65)  BP(mean): --  RR: 18 (15 - 19)  SpO2: 98% (95% - 98%)    PHYSICAL EXAM:  Gen: NAD, well-developed  Head: Normocephalic, Atraumatic  Eyes: PERRL, EOMI, no scleral injection  Nose: Oozing slightly from right nare, surgicel packing placed to control bleeding  Mouth: Large apthous ulcers noted in the left buccal mucosa, tender, no bleeding, no soft palate edema  Neck: Flat, supple, no lymphadenopathy, trachea midline, no masses  Resp: breathing easily, no stridor  CV: no peripheral edema/cyanosis    LABS:                        12.5   13.7  )-----------( 246      ( 15 Moshe 2017 01:45 )             36.9       IMAGING/ADDITIONAL STUDIES: POD/STATUS POST/ENT ISSUE: Mucositis due to RT    INTERVAL HPI: C/O recurrent epistaxis, oral pain    Vital Signs Last 24 Hrs  T(C): 36.4, Max: 36.7 (06-14 @ 15:50)  T(F): 97.5, Max: 98 (06-14 @ 15:50)  HR: 89 (71 - 94)  BP: 118/60 (104/63 - 144/65)  BP(mean): --  RR: 18 (15 - 19)  SpO2: 98% (95% - 98%)    PHYSICAL EXAM:  Gen: NAD, well-developed  Head: Normocephalic, Atraumatic  Eyes: PERRL, EOMI, no scleral injection  Nose: Oozing slightly from right nare, surgicel packing placed to control bleeding  Mouth: Large apthous ulcers noted in the left buccal mucosa, tender, no bleeding, no soft palate edema  Neck: Flat, supple, no lymphadenopathy, trachea midline, no masses  Resp: breathing easily, no stridor  CV: no peripheral edema/cyanosis    LABS:                        12.5   13.7  )-----------( 246      ( 15 Moshe 2017 01:45 )             36.9       IMAGING/ADDITIONAL STUDIES: Reviewed his outside records and MRI/PET from April 2017 with no recurrent disease.
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
## 2017-06-15 NOTE — DISCHARGE NOTE ADULT - SOCIAL WORKER'S NAME
[email or web address], [phone] [email or web address], [phone]

## 2017-06-15 NOTE — DISCHARGE NOTE ADULT - MEDICATION SUMMARY - MEDICATIONS TO STOP TAKING
I will STOP taking the medications listed below when I get home from the hospital:    Cipro 500 mg oral tablet  -- 1 tab(s) by mouth every 12 hours    Xarelto 15 mg oral tablet  -- 1 tab(s) by mouth once a day (in the evening)
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
## 2017-06-15 NOTE — PROGRESS NOTE ADULT - PROBLEM SELECTOR PLAN 8
follows at [facility] with Dr Otto Toney-[phone].  Has upcoming appointment for PET/MRI scans to assess status.  Will f/u as outpatient.

## 2017-06-15 NOTE — DISCHARGE NOTE ADULT - SECONDARY DIAGNOSIS.
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

## 2017-06-15 NOTE — DISCHARGE NOTE ADULT - CARE PROVIDER_API CALL
arlin Toney  [phone]  Phone: (   )    -  Fax: (       - arlin Toney  [phone]  Phone: (   )    -  Fax: (   )    -    Joan Damon), Otolaryngology  [address] [place] [address]  Phone: [phone]  Fax: [phone]

## 2017-06-15 NOTE — DISCHARGE NOTE ADULT - ADDITIONAL INSTRUCTIONS
Followup with Ear Nose Throat Doctor in one week   Followup with Primary care doctor in one week  Followup with Oncologist at [facility] in one week
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
## 2017-06-15 NOTE — DISCHARGE NOTE ADULT - MEDICATION SUMMARY - MEDICATIONS TO TAKE
I will START or STAY ON the medications listed below when I get home from the hospital:    aspirin 81 mg oral delayed release capsule  -- 1 dose(s) by mouth once a day  -- Indication: For CAD (coronary artery disease)    Cozaar 25 mg oral tablet  -- 1 tab(s) by mouth once a day  -- Indication: For HTN    Flomax 0.4 mg oral capsule  -- 1 cap(s) by mouth 2 times a day  -- Indication: For BPH    Nitrostat 0.4 mg sublingual tablet  -- 1 tab(s) under tongue once a day, As Needed  -- Indication: For Chest pain if needed    Imdur  -- 60 milligram(s) by mouth once a day  -- Indication: For HTN, Chest pain    Remeron 15 mg oral tablet  -- 1 tab(s) by mouth once a day (at bedtime)  -- Indication: For Depression    Onglyza 2.5 mg oral tablet  -- 1 tab(s) by mouth once a day  -- Indication: For Diabetes    Lipitor 40 mg oral tablet  -- 1 tab(s) by mouth once a day (at bedtime)  -- Indication: For HLD     Toprol-XL 25 mg oral tablet, extended release  -- 1 tab(s) by mouth once a day  -- Indication: For HLD    Ventolin HFA 90 mcg/inh inhalation aerosol  -- 2 puff(s) inhaled 4 times a day, As Needed  -- Indication: For CoPD    Spiriva 18 mcg inhalation capsule  -- 1 dose(s) inhaled once a day  -- Indication: For CoPD    Advair Diskus 100 mcg-50 mcg inhalation powder  -- 1 puff(s) inhaled 2 times a day  -- Indication: For Copd    bacitracin 500 units/g topical ointment  -- 1 application on skin 2 times a day  -- Indication: For To open areas of skin    Amitiza 24 mcg oral capsule  -- 1 cap(s) by mouth 2 times a day  -- Indication: For Constipation    docusate sodium 100 mg oral capsule  -- 1 cap(s) by mouth 3 times a day  -- Indication: For stool softner    saliva substitutes oral solution  -- 30 milliliter(s) by mouth 3 times a day  -- Indication: For saliva substitute    aluminum hydroxide/diphenhydramine/lidocaine/MgOH/simethicone mucous membrane suspension  -- 10 milliliter(s) mucous membrane every 2 to 3 hours, As Needed  -- Indication: For Mucositis due to radiation therapy    sodium chloride 0.65% nasal spray  -- 1 spray(s) into nose every 4 hours, As Needed  -- Indication: For Nasal moisture, congestion     omeprazole 40 mg oral delayed release capsule  -- 1 cap(s) by mouth once a day  -- Indication: For GERD    levothyroxine 112 mcg (0.112 mg) oral tablet  -- 1 tab(s) by mouth once a day  -- Indication: For hypothyroid    Vitamin D3 50,000 intl units oral capsule  -- 1 cap(s) by mouth once a week  -- Indication: For vit supplement
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
## 2017-06-15 NOTE — DISCHARGE NOTE ADULT - PATIENT PORTAL LINK FT
“You can access the Follow[employer] Patient Portal, offered by [facility], by registering with the following website: [email or web address]”

## 2017-06-15 NOTE — DISCHARGE NOTE ADULT - CARE PROVIDERS DIRECT ADDRESSES
,DirectAddress_Unknown ,DirectAddress_Unknown,[email or web address]

## 2017-06-15 NOTE — DISCHARGE NOTE ADULT - CARE PLAN
Principal Discharge DX:	CAD (coronary artery disease)  Goal:	to remain without reoccurring chest pain or complication from cardiac cath  Instructions for follow-up, activity and diet:	Monitor site of procedure for any redness, swelling, bleeding and notify your doctor. no heavy lifting over 5 lbs for 1 week, no strenuous activity for 3 weeks, if area becomes red looks bruised or swollen call MD or come to emergency department.  Monitor site of procedure and notify your doctor for any redness/swelling/drainage.  You may shower but no baths or swimming for one week or until skin is healed.  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  If you take oral diabetes medications, check your blood glucose two times a day.  If you take insulin, check your blood glucose before meals and at bedtime.  It's important not to skip any meals.  Keep a log of your blood glucose results and always take it with you to your doctor appointments.  Keep a list of your current medications including injectables and over the counter medications and bring this medication list with you to all your doctor appointments.  If you have not seen your ophthalmologist this year call for appointment.  Check your feet daily for redness, sores, or openings. Do not self treat. If no improvement in two days call your primary care physician for an appointment.  Low blood sugar (hypoglycemia) is a blood sugar below 70mg/dl. Check your blood sugar if you feel signs/symptoms of hypoglycemia. If your blood sugar is below 70 take 15 grams of carbohydrates (ex 4 oz of apple juice, 3-4 glucose tablets, or 4-6 oz of regular soda) wait 15 minutes and repeat blood sugar to make sure it comes up above 70.  If your blood sugar is above 70 and you are due for a meal, have a meal.  If you are not due for a meal have a snack.  This snack helps keeps your blood sugar at a safe range.  Secondary Diagnosis:	Essential hypertension  Instructions for follow-up, activity and diet:	Follow up with your medical doctor to establish long term blood pressure treatment goals.  Secondary Diagnosis:	Pulmonary embolism  Instructions for follow-up, activity and diet:	Take your anticoagulation (lovenox, coumadin) as directed.  Follow up with your health care provider within one week. Call for appointment.  If you develop shortness of breath or if your shortness of breath worsens call your Health Care Provider or go to the Emergency Department. Principal Discharge DX:	CAD (coronary artery disease)  Goal:	to remain without reoccurring chest pain or complication from cardiac cath  Instructions for follow-up, activity and diet:	Monitor site of procedure for any redness, swelling, bleeding and notify your doctor. no heavy lifting over 5 lbs for 1 week, no strenuous activity for 3 weeks, if area becomes red looks bruised or swollen call MD or come to emergency department.  Monitor site of procedure and notify your doctor for any redness/swelling/drainage.  You may shower but no baths or swimming for one week or until skin is healed.  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  If you take oral diabetes medications, check your blood glucose two times a day.  If you take insulin, check your blood glucose before meals and at bedtime.  It's important not to skip any meals.  Keep a log of your blood glucose results and always take it with you to your doctor appointments.  Keep a list of your current medications including injectables and over the counter medications and bring this medication list with you to all your doctor appointments.  If you have not seen your ophthalmologist this year call for appointment.  Check your feet daily for redness, sores, or openings. Do not self treat. If no improvement in two days call your primary care physician for an appointment.  Low blood sugar (hypoglycemia) is a blood sugar below 70mg/dl. Check your blood sugar if you feel signs/symptoms of hypoglycemia. If your blood sugar is below 70 take 15 grams of carbohydrates (ex 4 oz of apple juice, 3-4 glucose tablets, or 4-6 oz of regular soda) wait 15 minutes and repeat blood sugar to make sure it comes up above 70.  If your blood sugar is above 70 and you are due for a meal, have a meal.  If you are not due for a meal have a snack.  This snack helps keeps your blood sugar at a safe range.  Secondary Diagnosis:	Essential hypertension  Instructions for follow-up, activity and diet:	Follow up with your medical doctor to establish long term blood pressure treatment goals.  Secondary Diagnosis:	Pulmonary embolism  Instructions for follow-up, activity and diet:	Take your anticoagulation (lovenox, coumadin) as directed.  Follow up with your health care provider within one week. Call for appointment.  If you develop shortness of breath or if your shortness of breath worsens call your Health Care Provider or go to the Emergency Department.  No need for further xarelto as per outpatient hematology. Follow up in 2 weeks Principal Discharge DX:	CAD (coronary artery disease)  Goal:	to remain without reoccurring chest pain or complication from cardiac cath  Instructions for follow-up, activity and diet:	Monitor site of procedure for any redness, swelling, bleeding and notify your doctor. no heavy lifting over 5 lbs for 1 week, no strenuous activity for 3 weeks, if area becomes red looks bruised or swollen call MD or come to emergency department.  Monitor site of procedure and notify your doctor for any redness/swelling/drainage.  You may shower but no baths or swimming for one week or until skin is healed.  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  If you take oral diabetes medications, check your blood glucose two times a day.  If you take insulin, check your blood glucose before meals and at bedtime.  It's important not to skip any meals.  Keep a log of your blood glucose results and always take it with you to your doctor appointments.  Keep a list of your current medications including injectables and over the counter medications and bring this medication list with you to all your doctor appointments.  If you have not seen your ophthalmologist this year call for appointment.  Check your feet daily for redness, sores, or openings. Do not self treat. If no improvement in two days call your primary care physician for an appointment.  Low blood sugar (hypoglycemia) is a blood sugar below 70mg/dl. Check your blood sugar if you feel signs/symptoms of hypoglycemia. If your blood sugar is below 70 take 15 grams of carbohydrates (ex 4 oz of apple juice, 3-4 glucose tablets, or 4-6 oz of regular soda) wait 15 minutes and repeat blood sugar to make sure it comes up above 70.  If your blood sugar is above 70 and you are due for a meal, have a meal.  If you are not due for a meal have a snack.  This snack helps keeps your blood sugar at a safe range.  Secondary Diagnosis:	Essential hypertension  Instructions for follow-up, activity and diet:	Follow up with your medical doctor to establish long term blood pressure treatment goals.  Secondary Diagnosis:	Pulmonary embolism  Instructions for follow-up, activity and diet:	In discussion with your oncology team at [facility] [place], you were taken off of anticoagulation (xarelto) at this time, having already completed 6 months of treatment.  Please f/u with your oncology/hematology team as soon as possible after discharge.  If you develop shortness of breath or if your shortness of breath worsens call your Health Care Provider or go to the Emergency Department.  Secondary Diagnosis:	Orbital fracture  Instructions for follow-up, activity and diet:	- avoid nose blowing, open mouth sneezing  - Ice packs  - recommend outpatient followup with Dr. Laurence Mckeon with one week of discharge at [address] [place] [address] [phone] Principal Discharge DX:	CAD (coronary artery disease)  Goal:	to remain without reoccurring chest pain or complication from cardiac cath  Instructions for follow-up, activity and diet:	Monitor site of procedure for any redness, swelling, bleeding and notify your doctor. no heavy lifting over 5 lbs for 1 week, no strenuous activity for 3 weeks, if area becomes red looks bruised or swollen call MD or come to emergency department.  Monitor site of procedure and notify your doctor for any redness/swelling/drainage.  You may shower but no baths or swimming for one week or until skin is healed.  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  If you take oral diabetes medications, check your blood glucose two times a day.  If you take insulin, check your blood glucose before meals and at bedtime.  It's important not to skip any meals.  Keep a log of your blood glucose results and always take it with you to your doctor appointments.  Keep a list of your current medications including injectables and over the counter medications and bring this medication list with you to all your doctor appointments.  If you have not seen your ophthalmologist this year call for appointment.  Check your feet daily for redness, sores, or openings. Do not self treat. If no improvement in two days call your primary care physician for an appointment.  Low blood sugar (hypoglycemia) is a blood sugar below 70mg/dl. Check your blood sugar if you feel signs/symptoms of hypoglycemia. If your blood sugar is below 70 take 15 grams of carbohydrates (ex 4 oz of apple juice, 3-4 glucose tablets, or 4-6 oz of regular soda) wait 15 minutes and repeat blood sugar to make sure it comes up above 70.  If your blood sugar is above 70 and you are due for a meal, have a meal.  If you are not due for a meal have a snack.  This snack helps keeps your blood sugar at a safe range.  Secondary Diagnosis:	Essential hypertension  Instructions for follow-up, activity and diet:	Follow up with your medical doctor to establish long term blood pressure treatment goals.  Secondary Diagnosis:	Pulmonary embolism  Instructions for follow-up, activity and diet:	In discussion with your oncology team at [facility], you were taken off of anticoagulation (xarelto) at this time, having already completed 6 months of treatment.  Please f/u with your oncology/hematology team as soon as possible after discharge.  If you develop shortness of breath or if your shortness of breath worsens call your Health Care Provider or go to the Emergency Department.  Secondary Diagnosis:	Orbital fracture  Instructions for follow-up, activity and diet:	- avoid nose blowing, open mouth sneezing  - Ice packs  - recommend outpatient followup with Dr. Laurence Mckeon with one week of discharge at [address] [place] [address] [phone] Principal Discharge DX:	CAD (coronary artery disease)  Goal:	to remain without reoccurring chest pain or complication from cardiac cath  Instructions for follow-up, activity and diet:	Monitor site of procedure for any redness, swelling, bleeding and notify your doctor. no heavy lifting over 5 lbs for 1 week, no strenuous activity for 3 weeks, if area becomes red looks bruised or swollen call MD or come to emergency department.  Monitor site of procedure and notify your doctor for any redness/swelling/drainage.  You may shower but no baths or swimming for one week or until skin is healed.  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  If you take oral diabetes medications, check your blood glucose two times a day.  If you take insulin, check your blood glucose before meals and at bedtime.  It's important not to skip any meals.  Keep a log of your blood glucose results and always take it with you to your doctor appointments.  Keep a list of your current medications including injectables and over the counter medications and bring this medication list with you to all your doctor appointments.  If you have not seen your ophthalmologist this year call for appointment.  Check your feet daily for redness, sores, or openings. Do not self treat. If no improvement in two days call your primary care physician for an appointment.  Low blood sugar (hypoglycemia) is a blood sugar below 70mg/dl. Check your blood sugar if you feel signs/symptoms of hypoglycemia. If your blood sugar is below 70 take 15 grams of carbohydrates (ex 4 oz of apple juice, 3-4 glucose tablets, or 4-6 oz of regular soda) wait 15 minutes and repeat blood sugar to make sure it comes up above 70.  If your blood sugar is above 70 and you are due for a meal, have a meal.  If you are not due for a meal have a snack.  This snack helps keeps your blood sugar at a safe range.  Secondary Diagnosis:	Essential hypertension  Instructions for follow-up, activity and diet:	Follow up with your medical doctor to establish long term blood pressure treatment goals.  Secondary Diagnosis:	Pulmonary embolism  Instructions for follow-up, activity and diet:	In discussion with your oncology team at [facility] [place], you were taken off of anticoagulation (xarelto) at this time, having already completed 6 months of treatment.  Please f/u with your oncology/hematology team as soon as possible after discharge.  If you develop shortness of breath or if your shortness of breath worsens call your Health Care Provider or go to the Emergency Department.  Secondary Diagnosis:	Orbital fracture  Instructions for follow-up, activity and diet:	- avoid nose blowing, open mouth sneezing  - Ice packs  - recommend outpatient followup with Dr. Laurence Mckeon with one week of discharge at [address] [place] [address] [phone] Principal Discharge DX:	CAD (coronary artery disease)  Goal:	to remain without reoccurring chest pain or complication from cardiac cath  Instructions for follow-up, activity and diet:	Monitor site of procedure for any redness, swelling, bleeding and notify your doctor. no heavy lifting over 5 lbs for 1 week, no strenuous activity for 3 weeks, if area becomes red looks bruised or swollen call MD or come to emergency department.  Monitor site of procedure and notify your doctor for any redness/swelling/drainage.  You may shower but no baths or swimming for one week or until skin is healed.  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  If you take insulin, check your blood glucose before meals and at bedtime.  It's important not to skip any meals.  Keep a log of your blood glucose results and always take it with you to your doctor appointments.  Keep a list of your current medications including injectables and over the counter medications and bring this medication list with you to all your doctor appointments.  If you have not seen your ophthalmologist this year call for appointment.  Check your feet daily for redness, sores, or openings. Do not self treat. If no improvement in two days call your primary care physician for an appointment.  Low blood sugar (hypoglycemia) is a blood sugar below 70mg/dl. Check your blood sugar if you feel signs/symptoms of hypoglycemia. If your blood sugar is below 70 take 15 grams of carbohydrates (ex 4 oz of apple juice, 3-4 glucose tablets, or 4-6 oz of regular soda) wait 15 minutes and repeat blood sugar to make sure it comes up above 70.  If your blood sugar is above 70 and you are due for a meal, have a meal.  If you are not due for a meal have a snack.  This snack helps keeps your blood sugar at a safe range.  Secondary Diagnosis:	Essential hypertension  Instructions for follow-up, activity and diet:	Follow up with your medical doctor to establish long term blood pressure treatment goals.  Secondary Diagnosis:	Pulmonary embolism  Instructions for follow-up, activity and diet:	In discussion with your oncology team at [facility], you were taken off of anticoagulation (xarelto) at this time, having already completed 6 months of treatment.  Please f/u with your oncology/hematology team as soon as possible after discharge.  If you develop shortness of breath or if your shortness of breath worsens call your Health Care Provider or go to the Emergency Department.  Secondary Diagnosis:	Orbital fracture  Instructions for follow-up, activity and diet:	- avoid nose blowing, open mouth sneezing  - Ice packs  - recommend outpatient followup with Dr. Laurence Mckeon with one week of discharge at [address] [place] [address] [phone]  Secondary Diagnosis:	Melanoma  Instructions for follow-up, activity and diet:	it is very important to follow up with your oncologist and repeat MRI of your maxillofacial area as the scan here showed a fracture, unclear if due to your trauma or to underlying malignancy. Principal Discharge DX:	CAD (coronary artery disease)  Goal:	to remain without reoccurring chest pain or complication from cardiac cath  Instructions for follow-up, activity and diet:	Monitor site of procedure for any redness, swelling, bleeding and notify your doctor. no heavy lifting over 5 lbs for 1 week, no strenuous activity for 3 weeks, if area becomes red looks bruised or swollen call MD or come to emergency department.  Monitor site of procedure and notify your doctor for any redness/swelling/drainage.  You may shower but no baths or swimming for one week or until skin is healed.  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  If you take insulin, check your blood glucose before meals and at bedtime.  It's important not to skip any meals.  Keep a log of your blood glucose results and always take it with you to your doctor appointments.  Keep a list of your current medications including injectables and over the counter medications and bring this medication list with you to all your doctor appointments.  If you have not seen your ophthalmologist this year call for appointment.  Check your feet daily for redness, sores, or openings. Do not self treat. If no improvement in two days call your primary care physician for an appointment.  Low blood sugar (hypoglycemia) is a blood sugar below 70mg/dl. Check your blood sugar if you feel signs/symptoms of hypoglycemia. If your blood sugar is below 70 take 15 grams of carbohydrates (ex 4 oz of apple juice, 3-4 glucose tablets, or 4-6 oz of regular soda) wait 15 minutes and repeat blood sugar to make sure it comes up above 70.  If your blood sugar is above 70 and you are due for a meal, have a meal.  If you are not due for a meal have a snack.  This snack helps keeps your blood sugar at a safe range.  Secondary Diagnosis:	Essential hypertension  Instructions for follow-up, activity and diet:	Follow up with your medical doctor to establish long term blood pressure treatment goals.  Secondary Diagnosis:	Pulmonary embolism  Instructions for follow-up, activity and diet:	In discussion with your oncology team at [facility] [place], you were taken off of anticoagulation (xarelto) at this time, having already completed 6 months of treatment.  Please f/u with your oncology/hematology team as soon as possible after discharge.  If you develop shortness of breath or if your shortness of breath worsens call your Health Care Provider or go to the Emergency Department.  Secondary Diagnosis:	Orbital fracture  Instructions for follow-up, activity and diet:	- avoid nose blowing, open mouth sneezing  - Ice packs  - recommend outpatient followup with Dr. Laurence Mckeon with one week of discharge at [address] [place] [address] [phone]  Secondary Diagnosis:	Melanoma  Instructions for follow-up, activity and diet:	it is very important to follow up with your oncologist and repeat MRI of your maxillofacial area as the scan here showed a fracture, unclear if due to your trauma or to underlying malignancy. Principal Discharge DX:	CAD (coronary artery disease)  Goal:	to remain without reoccurring chest pain or complication from cardiac cath  Instructions for follow-up, activity and diet:	Monitor site of procedure for any redness, swelling, bleeding and notify your doctor. no heavy lifting over 5 lbs for 1 week, no strenuous activity for 3 weeks, if area becomes red looks bruised or swollen call MD or come to emergency department.  Monitor site of procedure and notify your doctor for any redness/swelling/drainage.  You may shower but no baths or swimming for one week or until skin is healed.  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  If you take insulin, check your blood glucose before meals and at bedtime.  It's important not to skip any meals.  Keep a log of your blood glucose results and always take it with you to your doctor appointments.  Keep a list of your current medications including injectables and over the counter medications and bring this medication list with you to all your doctor appointments.  If you have not seen your ophthalmologist this year call for appointment.  Check your feet daily for redness, sores, or openings. Do not self treat. If no improvement in two days call your primary care physician for an appointment.  Low blood sugar (hypoglycemia) is a blood sugar below 70mg/dl. Check your blood sugar if you feel signs/symptoms of hypoglycemia. If your blood sugar is below 70 take 15 grams of carbohydrates (ex 4 oz of apple juice, 3-4 glucose tablets, or 4-6 oz of regular soda) wait 15 minutes and repeat blood sugar to make sure it comes up above 70.  If your blood sugar is above 70 and you are due for a meal, have a meal.  If you are not due for a meal have a snack.  This snack helps keeps your blood sugar at a safe range.  Secondary Diagnosis:	Essential hypertension  Instructions for follow-up, activity and diet:	Follow up with your medical doctor to establish long term blood pressure treatment goals.  Secondary Diagnosis:	Pulmonary embolism  Instructions for follow-up, activity and diet:	In discussion with your oncology team at [facility], you were taken off of anticoagulation (xarelto) at this time, having already completed 6 months of treatment.  Please f/u with your oncology/hematology team as soon as possible after discharge.  If you develop shortness of breath or if your shortness of breath worsens call your Health Care Provider or go to the Emergency Department.  Secondary Diagnosis:	Orbital fracture  Instructions for follow-up, activity and diet:	- avoid nose blowing, open mouth sneezing  - Ice packs  - recommend outpatient followup with Dr. Laurence Mckeon with one week of discharge at [address] [place] [address] [phone]  Secondary Diagnosis:	Melanoma  Instructions for follow-up, activity and diet:	it is very important to follow up with your oncologist and repeat MRI of your maxillofacial area as the scan here showed a fracture, unclear if due to your trauma or to underlying malignancy. Principal Discharge DX:	CAD (coronary artery disease)  Goal:	to remain without reoccurring chest pain or complication from cardiac cath  Instructions for follow-up, activity and diet:	Monitor site of procedure for any redness, swelling, bleeding and notify your doctor. no heavy lifting over 5 lbs for 1 week, no strenuous activity for 3 weeks, if area becomes red looks bruised or swollen call MD or come to emergency department.  Monitor site of procedure and notify your doctor for any redness/swelling/drainage.  You may shower but no baths or swimming for one week or until skin is healed.  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  If you take insulin, check your blood glucose before meals and at bedtime.  It's important not to skip any meals.  Keep a log of your blood glucose results and always take it with you to your doctor appointments.  Keep a list of your current medications including injectables and over the counter medications and bring this medication list with you to all your doctor appointments.  If you have not seen your ophthalmologist this year call for appointment.  Check your feet daily for redness, sores, or openings. Do not self treat. If no improvement in two days call your primary care physician for an appointment.  Low blood sugar (hypoglycemia) is a blood sugar below 70mg/dl. Check your blood sugar if you feel signs/symptoms of hypoglycemia. If your blood sugar is below 70 take 15 grams of carbohydrates (ex 4 oz of apple juice, 3-4 glucose tablets, or 4-6 oz of regular soda) wait 15 minutes and repeat blood sugar to make sure it comes up above 70.  If your blood sugar is above 70 and you are due for a meal, have a meal.  If you are not due for a meal have a snack.  This snack helps keeps your blood sugar at a safe range.  Secondary Diagnosis:	Essential hypertension  Instructions for follow-up, activity and diet:	Follow up with your medical doctor to establish long term blood pressure treatment goals.  Secondary Diagnosis:	Pulmonary embolism  Instructions for follow-up, activity and diet:	In discussion with your oncology team at [facility], you were taken off of anticoagulation (xarelto) at this time, having already completed 6 months of treatment.  Please f/u with your oncology/hematology team as soon as possible after discharge.  If you develop shortness of breath or if your shortness of breath worsens call your Health Care Provider or go to the Emergency Department.  Secondary Diagnosis:	Orbital fracture  Instructions for follow-up, activity and diet:	- avoid nose blowing, open mouth sneezing  - Ice packs  - recommend outpatient followup with Dr. Laurence Mckeon with one week of discharge at [address] [place] [address] [phone]  Secondary Diagnosis:	Melanoma  Instructions for follow-up, activity and diet:	it is very important to follow up with your oncologist and repeat MRI of your maxillofacial area as the scan here showed a fracture, unclear if due to your trauma or to underlying malignancy. Principal Discharge DX:	CAD (coronary artery disease)  Goal:	to remain without reoccurring chest pain or complication from cardiac cath  Instructions for follow-up, activity and diet:	Monitor site of procedure for any redness, swelling, bleeding and notify your doctor. no heavy lifting over 5 lbs for 1 week, no strenuous activity for 3 weeks, if area becomes red looks bruised or swollen call MD or come to emergency department.  Monitor site of procedure and notify your doctor for any redness/swelling/drainage.  You may shower but no baths or swimming for one week or until skin is healed.  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  If you take insulin, check your blood glucose before meals and at bedtime.  It's important not to skip any meals.  Keep a log of your blood glucose results and always take it with you to your doctor appointments.  Keep a list of your current medications including injectables and over the counter medications and bring this medication list with you to all your doctor appointments.  If you have not seen your ophthalmologist this year call for appointment.  Check your feet daily for redness, sores, or openings. Do not self treat. If no improvement in two days call your primary care physician for an appointment.  Low blood sugar (hypoglycemia) is a blood sugar below 70mg/dl. Check your blood sugar if you feel signs/symptoms of hypoglycemia. If your blood sugar is below 70 take 15 grams of carbohydrates (ex 4 oz of apple juice, 3-4 glucose tablets, or 4-6 oz of regular soda) wait 15 minutes and repeat blood sugar to make sure it comes up above 70.  If your blood sugar is above 70 and you are due for a meal, have a meal.  If you are not due for a meal have a snack.  This snack helps keeps your blood sugar at a safe range.  Secondary Diagnosis:	Essential hypertension  Instructions for follow-up, activity and diet:	Follow up with your medical doctor to establish long term blood pressure treatment goals.  Secondary Diagnosis:	Pulmonary embolism  Instructions for follow-up, activity and diet:	In discussion with your oncology team at [facility], you were taken off of anticoagulation (xarelto) at this time, having already completed 6 months of treatment.  Please f/u with your oncology/hematology team as soon as possible after discharge.  If you develop shortness of breath or if your shortness of breath worsens call your Health Care Provider or go to the Emergency Department.  Secondary Diagnosis:	Orbital fracture  Instructions for follow-up, activity and diet:	- avoid nose blowing, open mouth sneezing  - Ice packs  - recommend outpatient followup with Dr. Laurence Mckeon with one week of discharge at [address] [place] [address] [phone]  Secondary Diagnosis:	Melanoma  Instructions for follow-up, activity and diet:	it is very important to follow up with your oncologist and repeat MRI of your maxillofacial area as the scan here showed a fracture, unclear if due to your trauma or to underlying malignancy. Principal Discharge DX:	CAD (coronary artery disease)  Goal:	to remain without reoccurring chest pain or complication from cardiac cath  Instructions for follow-up, activity and diet:	Monitor site of procedure for any redness, swelling, bleeding and notify your doctor. no heavy lifting over 5 lbs for 1 week, no strenuous activity for 3 weeks, if area becomes red looks bruised or swollen call MD or come to emergency department.  Monitor site of procedure and notify your doctor for any redness/swelling/drainage.  You may shower but no baths or swimming for one week or until skin is healed.  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  If you take insulin, check your blood glucose before meals and at bedtime.  It's important not to skip any meals.  Keep a log of your blood glucose results and always take it with you to your doctor appointments.  Keep a list of your current medications including injectables and over the counter medications and bring this medication list with you to all your doctor appointments.  If you have not seen your ophthalmologist this year call for appointment.  Check your feet daily for redness, sores, or openings. Do not self treat. If no improvement in two days call your primary care physician for an appointment.  Low blood sugar (hypoglycemia) is a blood sugar below 70mg/dl. Check your blood sugar if you feel signs/symptoms of hypoglycemia. If your blood sugar is below 70 take 15 grams of carbohydrates (ex 4 oz of apple juice, 3-4 glucose tablets, or 4-6 oz of regular soda) wait 15 minutes and repeat blood sugar to make sure it comes up above 70.  If your blood sugar is above 70 and you are due for a meal, have a meal.  If you are not due for a meal have a snack.  This snack helps keeps your blood sugar at a safe range.  Secondary Diagnosis:	Essential hypertension  Instructions for follow-up, activity and diet:	Follow up with your medical doctor to establish long term blood pressure treatment goals.  Secondary Diagnosis:	Pulmonary embolism  Instructions for follow-up, activity and diet:	In discussion with your oncology team at [facility], you were taken off of anticoagulation (xarelto) at this time, having already completed 6 months of treatment.  Please f/u with your oncology/hematology team as soon as possible after discharge.  If you develop shortness of breath or if your shortness of breath worsens call your Health Care Provider or go to the Emergency Department.  Secondary Diagnosis:	Orbital fracture  Instructions for follow-up, activity and diet:	- avoid nose blowing, open mouth sneezing  - Ice packs  - recommend outpatient followup with Dr. Laurence Mckeon with one week of discharge at [address] [place] [address] [phone]  Secondary Diagnosis:	Melanoma  Instructions for follow-up, activity and diet:	it is very important to follow up with your oncologist and repeat MRI of your maxillofacial area as the scan here showed a fracture, unclear if due to your trauma or to underlying malignancy. Principal Discharge DX:	CAD (coronary artery disease)  Goal:	to remain without reoccurring chest pain or complication from cardiac cath  Instructions for follow-up, activity and diet:	Monitor site of procedure for any redness, swelling, bleeding and notify your doctor. no heavy lifting over 5 lbs for 1 week, no strenuous activity for 3 weeks, if area becomes red looks bruised or swollen call MD or come to emergency department.  Monitor site of procedure and notify your doctor for any redness/swelling/drainage.  You may shower but no baths or swimming for one week or until skin is healed.  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  If you take insulin, check your blood glucose before meals and at bedtime.  It's important not to skip any meals.  Keep a log of your blood glucose results and always take it with you to your doctor appointments.  Keep a list of your current medications including injectables and over the counter medications and bring this medication list with you to all your doctor appointments.  If you have not seen your ophthalmologist this year call for appointment.  Check your feet daily for redness, sores, or openings. Do not self treat. If no improvement in two days call your primary care physician for an appointment.  Low blood sugar (hypoglycemia) is a blood sugar below 70mg/dl. Check your blood sugar if you feel signs/symptoms of hypoglycemia. If your blood sugar is below 70 take 15 grams of carbohydrates (ex 4 oz of apple juice, 3-4 glucose tablets, or 4-6 oz of regular soda) wait 15 minutes and repeat blood sugar to make sure it comes up above 70.  If your blood sugar is above 70 and you are due for a meal, have a meal.  If you are not due for a meal have a snack.  This snack helps keeps your blood sugar at a safe range.  Secondary Diagnosis:	Essential hypertension  Instructions for follow-up, activity and diet:	Follow up with your medical doctor to establish long term blood pressure treatment goals.  Secondary Diagnosis:	Pulmonary embolism  Instructions for follow-up, activity and diet:	In discussion with your oncology team at [facility], you were taken off of anticoagulation (xarelto) at this time, having already completed 6 months of treatment.  Please f/u with your oncology/hematology team as soon as possible after discharge.  If you develop shortness of breath or if your shortness of breath worsens call your Health Care Provider or go to the Emergency Department.  Secondary Diagnosis:	Orbital fracture  Instructions for follow-up, activity and diet:	- avoid nose blowing, open mouth sneezing  - Ice packs  - recommend outpatient followup with Dr. Laurence Mckeon with one week of discharge at [address] [place] [address] [phone]  Secondary Diagnosis:	Melanoma  Instructions for follow-up, activity and diet:	it is very important to follow up with your oncologist and repeat MRI of your maxillofacial area as the scan here showed a fracture, unclear if due to your trauma or to underlying malignancy.

## 2017-06-15 NOTE — PROGRESS NOTE ADULT - PROBLEM SELECTOR PLAN 1
1- Soft diet  2- Nasal saline  3- bacitracin ointment to BL nares 1- Soft diet 1- Soft diet  2- Magic mouthwash prn
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
## 2017-06-16 VITALS
TEMPERATURE: 99 F | SYSTOLIC BLOOD PRESSURE: 100 MMHG | HEART RATE: 69 BPM | OXYGEN SATURATION: 95 % | DIASTOLIC BLOOD PRESSURE: 65 MMHG | RESPIRATION RATE: 18 BRPM

## 2017-06-16 LAB
ANION GAP SERPL CALC-SCNC: 11 MMOL/L — SIGNIFICANT CHANGE UP (ref 5–17)
BUN SERPL-MCNC: 28 MG/DL — HIGH (ref 7–23)
CALCIUM SERPL-MCNC: 8.8 MG/DL — SIGNIFICANT CHANGE UP (ref 8.4–10.5)
CHLORIDE SERPL-SCNC: 108 MMOL/L — SIGNIFICANT CHANGE UP (ref 96–108)
CO2 SERPL-SCNC: 24 MMOL/L — SIGNIFICANT CHANGE UP (ref 22–31)
CREAT SERPL-MCNC: 1.08 MG/DL — SIGNIFICANT CHANGE UP (ref 0.5–1.3)
GLUCOSE SERPL-MCNC: 101 MG/DL — HIGH (ref 70–99)
HCT VFR BLD CALC: 36.8 % — LOW (ref 39–50)
HGB BLD-MCNC: 11.9 G/DL — LOW (ref 13–17)
MCHC RBC-ENTMCNC: 30.6 PG — SIGNIFICANT CHANGE UP (ref 27–34)
MCHC RBC-ENTMCNC: 32.4 GM/DL — SIGNIFICANT CHANGE UP (ref 32–36)
MCV RBC AUTO: 94.5 FL — SIGNIFICANT CHANGE UP (ref 80–100)
PLATELET # BLD AUTO: 220 K/UL — SIGNIFICANT CHANGE UP (ref 150–400)
POTASSIUM SERPL-MCNC: 4.2 MMOL/L — SIGNIFICANT CHANGE UP (ref 3.5–5.3)
POTASSIUM SERPL-SCNC: 4.2 MMOL/L — SIGNIFICANT CHANGE UP (ref 3.5–5.3)
RBC # BLD: 3.89 M/UL — LOW (ref 4.2–5.8)
RBC # FLD: 12.8 % — SIGNIFICANT CHANGE UP (ref 10.3–14.5)
SODIUM SERPL-SCNC: 143 MMOL/L — SIGNIFICANT CHANGE UP (ref 135–145)
WBC # BLD: 6.8 K/UL — SIGNIFICANT CHANGE UP (ref 3.8–10.5)
WBC # FLD AUTO: 6.8 K/UL — SIGNIFICANT CHANGE UP (ref 3.8–10.5)

## 2017-06-16 PROCEDURE — 83735 ASSAY OF MAGNESIUM: CPT

## 2017-06-16 PROCEDURE — 99239 HOSP IP/OBS DSCHRG MGMT >30: CPT

## 2017-06-16 PROCEDURE — 93459 L HRT ART/GRFT ANGIO: CPT

## 2017-06-16 PROCEDURE — C1769: CPT

## 2017-06-16 PROCEDURE — 85027 COMPLETE CBC AUTOMATED: CPT

## 2017-06-16 PROCEDURE — 93306 TTE W/DOPPLER COMPLETE: CPT

## 2017-06-16 PROCEDURE — 80048 BASIC METABOLIC PNL TOTAL CA: CPT

## 2017-06-16 PROCEDURE — 84484 ASSAY OF TROPONIN QUANT: CPT

## 2017-06-16 PROCEDURE — 93005 ELECTROCARDIOGRAM TRACING: CPT

## 2017-06-16 PROCEDURE — 84100 ASSAY OF PHOSPHORUS: CPT

## 2017-06-16 PROCEDURE — 99232 SBSQ HOSP IP/OBS MODERATE 35: CPT | Mod: GC

## 2017-06-16 PROCEDURE — C1894: CPT

## 2017-06-16 PROCEDURE — 70486 CT MAXILLOFACIAL W/O DYE: CPT

## 2017-06-16 PROCEDURE — C1887: CPT

## 2017-06-16 PROCEDURE — 83036 HEMOGLOBIN GLYCOSYLATED A1C: CPT

## 2017-06-16 PROCEDURE — 85610 PROTHROMBIN TIME: CPT

## 2017-06-16 PROCEDURE — 83880 ASSAY OF NATRIURETIC PEPTIDE: CPT

## 2017-06-16 PROCEDURE — 85730 THROMBOPLASTIN TIME PARTIAL: CPT

## 2017-06-16 PROCEDURE — 97162 PT EVAL MOD COMPLEX 30 MIN: CPT

## 2017-06-16 PROCEDURE — 71111 X-RAY EXAM RIBS/CHEST4/> VWS: CPT

## 2017-06-16 PROCEDURE — 80053 COMPREHEN METABOLIC PANEL: CPT

## 2017-06-16 RX ADMIN — Medication 30 MILLILITER(S): at 05:54

## 2017-06-16 RX ADMIN — Medication 100 MILLIGRAM(S): at 13:58

## 2017-06-16 RX ADMIN — Medication 1 APPLICATION(S): at 05:54

## 2017-06-16 RX ADMIN — Medication 100 MILLIGRAM(S): at 05:54

## 2017-06-16 RX ADMIN — LOSARTAN POTASSIUM 25 MILLIGRAM(S): 100 TABLET, FILM COATED ORAL at 05:54

## 2017-06-16 RX ADMIN — Medication 25 MILLIGRAM(S): at 05:54

## 2017-06-16 RX ADMIN — Medication 81 MILLIGRAM(S): at 11:57

## 2017-06-16 RX ADMIN — LUBIPROSTONE 24 MICROGRAM(S): 24 CAPSULE, GELATIN COATED ORAL at 05:57

## 2017-06-16 RX ADMIN — Medication 112 MICROGRAM(S): at 05:54

## 2017-06-16 RX ADMIN — Medication 30 MILLILITER(S): at 13:58

## 2017-06-16 RX ADMIN — Medication 2: at 11:57
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
## 2017-06-16 NOTE — CHART NOTE - NSCHARTNOTEFT_GEN_A_CORE
phone used (ID # [id number], Karolina), Grand-daughter Nelly was concerned that pt was dizzy.  Pt denies dizziness at this time and stated that ambulating with Physical Therapy made him feel better. Dr. Mason aware. Orthostatic BP negative.
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
## 2017-06-16 NOTE — PROGRESS NOTE ADULT - PROBLEM SELECTOR PROBLEM 2
Coronary artery disease of bypass graft of native heart with stable angina pectoris
Epistaxis, recurrent
Epistaxis, recurrent
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
## 2017-06-16 NOTE — PROGRESS NOTE ADULT - NSHPATTENDINGPLANDISCUSS_GEN_ALL_CORE
to reach Cardiology Attending call during weekdays Spectra [phone].
to reach Cardiology Attending call during weekdays Spectra [phone].
to reach Cardiology Attending call during weekdays Spectra [phone].
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
## 2017-06-16 NOTE — PROGRESS NOTE ADULT - PROBLEM SELECTOR PLAN 3
d/c OFF xarelto after discussion with patient's oncologist  Dr. Donnie Toney - [phone] ([facility] on 6/15.  -will be on asa 81 for his CAD.  F/u asap with oncologist at [facility]

## 2017-06-16 NOTE — PROGRESS NOTE ADULT - PROBLEM SELECTOR PLAN 10
OOB.
OOB.
appreciate ENT eval. Possibly related to recent radiation.  Continue magic mouthwash for mucositis.

## 2017-06-16 NOTE — PHYSICAL THERAPY INITIAL EVALUATION ADULT - ADDITIONAL COMMENTS
Pt lives w/ wife in an elevator apt w/ 3 steps to enter. PTA indep w/ use of occasional use of st cane outdoors. Currently staying at dtr's home while wife recovers from recent surgery. Pt dtr home w/ 5 and 6 steps to negotiate. Pt reports grandson is home after to school to assist prn

## 2017-06-16 NOTE — PROGRESS NOTE ADULT - PROBLEM SELECTOR PLAN 4
appreciate ENT.  Dissolvable packing in place, no need for prophylactic abx per ENT.  for nasal fractures:  Recommend soft diet x 6 weeks. No surgical intervention. Ice packs to the left cheek for comfort. Nasal saline spray since he will have bloody sinus drainage until the blood clears out. F/U as outpatient with ENT (may f/u with his head and neck surgeon managing his sinus melanoma) in 2 weeks.

## 2017-06-16 NOTE — PROGRESS NOTE ADULT - PROBLEM SELECTOR PROBLEM 1
Demand ischemia
Demand ischemia
NSTEMI (non-ST elevated myocardial infarction)
NSTEMI (non-ST elevated myocardial infarction)
Mucositis due to radiation therapy
Mucositis due to radiation therapy

## 2017-06-16 NOTE — PROGRESS NOTE ADULT - PROBLEM SELECTOR PLAN 9
appreciate ENT eval. Possibly related to recent radiation.  Continue magic mouthwash for mucositis.
appreciate ENT eval. Possibly related to recent radiation.  Continue magic mouthwash for mucositis.
follows at [facility] Otto Toney-[phone].  Has upcoming appointment for PET/MRI scans to assess status.  Will f/u as outpatient.
[place] [facility] [place] Toney-[phone].  Has upcoming appointment for PET/MRI scans to assess status.  Will f/u as outpatient.

## 2017-06-16 NOTE — PROGRESS NOTE ADULT - PROBLEM SELECTOR PLAN 8
follows at [facility] [place], [place] with Dr Otto Toney-[phone].  Has upcoming appointment for PET/MRI scans to assess status.  Will f/u as outpatient.  -given CT report, will pursue MRI as outpatient with his own oncologist. [place] at [facility] [place] Otto Toney-[phone].  Has upcoming appointment for PET/MRI scans to assess status.  Will f/u as outpatient.  Discussed this with granddaughter at bedside.  -given CT report, will pursue MRI as outpatient with his own oncologist.

## 2019-03-31 ENCOUNTER — EMERGENCY (EMERGENCY)
Facility: HOSPITAL | Age: 84
LOS: 1 days | Discharge: HOME | End: 2019-03-31
Attending: EMERGENCY MEDICINE | Admitting: EMERGENCY MEDICINE
Payer: MEDICARE

## 2019-03-31 VITALS
OXYGEN SATURATION: 99 % | HEART RATE: 82 BPM | TEMPERATURE: 98 F | SYSTOLIC BLOOD PRESSURE: 113 MMHG | RESPIRATION RATE: 16 BRPM | DIASTOLIC BLOOD PRESSURE: 60 MMHG

## 2019-03-31 DIAGNOSIS — Z90.49 ACQUIRED ABSENCE OF OTHER SPECIFIED PARTS OF DIGESTIVE TRACT: Chronic | ICD-10-CM

## 2019-03-31 DIAGNOSIS — D29.1 BENIGN NEOPLASM OF PROSTATE: Chronic | ICD-10-CM

## 2019-03-31 DIAGNOSIS — Z98.89 OTHER SPECIFIED POSTPROCEDURAL STATES: Chronic | ICD-10-CM

## 2019-03-31 PROCEDURE — 70450 CT HEAD/BRAIN W/O DYE: CPT | Mod: 26

## 2019-03-31 PROCEDURE — 73562 X-RAY EXAM OF KNEE 3: CPT | Mod: 26,LT

## 2019-03-31 PROCEDURE — 99284 EMERGENCY DEPT VISIT MOD MDM: CPT

## 2019-03-31 RX ORDER — ACETAMINOPHEN 500 MG
975 TABLET ORAL ONCE
Qty: 0 | Refills: 0 | Status: COMPLETED | OUTPATIENT
Start: 2019-03-31 | End: 2019-03-31

## 2019-03-31 RX ADMIN — Medication 975 MILLIGRAM(S): at 11:31

## 2019-03-31 NOTE — ED PROVIDER NOTE - CARE PLAN
Principal Discharge DX:	Injury of head, initial encounter  Secondary Diagnosis:	Knee injuries, left, initial encounter

## 2019-03-31 NOTE — ED PROVIDER NOTE - OBJECTIVE STATEMENT
Pt is a 83 y/o M who presents to the ED s/p fall 2 days ago. He was coming home when he fell onto the sidewalk and hit his left knee and head. Pt c/o HA as well as left knee pain, swelling, and an abrasion. Denies vomiting or LOC.

## 2019-03-31 NOTE — ED PROVIDER NOTE - PROGRESS NOTE DETAILS
Pt ambulatory, imaging noted. will dc to follow up with ortho and pmd. Knee placed in ace bandage. precautions reviewed.

## 2019-03-31 NOTE — ED PROVIDER NOTE - MUSCULOSKELETAL, MLM
Swelling to the left anterior knee with abrasion. Ant left knee tenderness. Normal ROM. No hip tenderness. No midline spine tenderness. Normal ROM of neck.

## 2019-03-31 NOTE — ED PROVIDER NOTE - PSH
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

## 2019-03-31 NOTE — ED ADULT TRIAGE NOTE - CHIEF COMPLAINT QUOTE
Pt family states that pt had mechanical fall 2 days ago, c/o pain to corner of left eye, and left knee, able to ambulate.  Pt did not seek medical attention until today because pain is getting worse.  NO LOC, no dizziness, no CP no SOB.  PMH CAD, CVA, melanoma no blood thinners

## 2019-03-31 NOTE — ED PROVIDER NOTE - CLINICAL SUMMARY MEDICAL DECISION MAKING FREE TEXT BOX
Pt is a 83 y/o M who presents to the ED s/p fall 2 days ago. Will obtain CT of head and XR left knee. Pain control.

## 2019-06-01 ENCOUNTER — OUTPATIENT (OUTPATIENT)
Dept: OUTPATIENT SERVICES | Facility: HOSPITAL | Age: 84
LOS: 1 days | End: 2019-06-01
Payer: MEDICARE

## 2019-06-01 DIAGNOSIS — Z90.49 ACQUIRED ABSENCE OF OTHER SPECIFIED PARTS OF DIGESTIVE TRACT: Chronic | ICD-10-CM

## 2019-06-01 DIAGNOSIS — Z98.89 OTHER SPECIFIED POSTPROCEDURAL STATES: Chronic | ICD-10-CM

## 2019-06-01 DIAGNOSIS — D29.1 BENIGN NEOPLASM OF PROSTATE: Chronic | ICD-10-CM

## 2019-06-01 PROCEDURE — G9001: CPT

## 2019-06-18 ENCOUNTER — INPATIENT (INPATIENT)
Facility: HOSPITAL | Age: 84
LOS: 5 days | Discharge: HOME | End: 2019-06-24
Attending: HOSPITALIST | Admitting: HOSPITALIST
Payer: MEDICARE

## 2019-06-18 VITALS
HEART RATE: 93 BPM | TEMPERATURE: 98 F | SYSTOLIC BLOOD PRESSURE: 114 MMHG | DIASTOLIC BLOOD PRESSURE: 52 MMHG | OXYGEN SATURATION: 77 % | RESPIRATION RATE: 22 BRPM

## 2019-06-18 DIAGNOSIS — D29.1 BENIGN NEOPLASM OF PROSTATE: Chronic | ICD-10-CM

## 2019-06-18 DIAGNOSIS — Z98.89 OTHER SPECIFIED POSTPROCEDURAL STATES: Chronic | ICD-10-CM

## 2019-06-18 DIAGNOSIS — Z90.49 ACQUIRED ABSENCE OF OTHER SPECIFIED PARTS OF DIGESTIVE TRACT: Chronic | ICD-10-CM

## 2019-06-18 LAB
ALBUMIN SERPL ELPH-MCNC: 3.9 G/DL — SIGNIFICANT CHANGE UP (ref 3.3–5)
ALP SERPL-CCNC: 61 U/L — SIGNIFICANT CHANGE UP (ref 40–120)
ALT FLD-CCNC: 16 U/L — SIGNIFICANT CHANGE UP (ref 4–41)
ANION GAP SERPL CALC-SCNC: 14 MMO/L — SIGNIFICANT CHANGE UP (ref 7–14)
APTT BLD: 21.5 SEC — LOW (ref 27.5–36.3)
AST SERPL-CCNC: 18 U/L — SIGNIFICANT CHANGE UP (ref 4–40)
BASE EXCESS BLDV CALC-SCNC: -1.8 MMOL/L — SIGNIFICANT CHANGE UP
BASOPHILS # BLD AUTO: 0.04 K/UL — SIGNIFICANT CHANGE UP (ref 0–0.2)
BASOPHILS NFR BLD AUTO: 0.6 % — SIGNIFICANT CHANGE UP (ref 0–2)
BILIRUB SERPL-MCNC: 0.5 MG/DL — SIGNIFICANT CHANGE UP (ref 0.2–1.2)
BLOOD GAS VENOUS - CREATININE: 0.93 MG/DL — SIGNIFICANT CHANGE UP (ref 0.5–1.3)
BUN SERPL-MCNC: 14 MG/DL — SIGNIFICANT CHANGE UP (ref 7–23)
CALCIUM SERPL-MCNC: 8.9 MG/DL — SIGNIFICANT CHANGE UP (ref 8.4–10.5)
CHLORIDE BLDV-SCNC: 110 MMOL/L — HIGH (ref 96–108)
CHLORIDE SERPL-SCNC: 108 MMOL/L — HIGH (ref 98–107)
CO2 SERPL-SCNC: 18 MMOL/L — LOW (ref 22–31)
CREAT SERPL-MCNC: 0.9 MG/DL — SIGNIFICANT CHANGE UP (ref 0.5–1.3)
EOSINOPHIL # BLD AUTO: 0.17 K/UL — SIGNIFICANT CHANGE UP (ref 0–0.5)
EOSINOPHIL NFR BLD AUTO: 2.5 % — SIGNIFICANT CHANGE UP (ref 0–6)
GAS PNL BLDV: 139 MMOL/L — SIGNIFICANT CHANGE UP (ref 136–146)
GLUCOSE BLDV-MCNC: 171 MG/DL — HIGH (ref 70–99)
GLUCOSE SERPL-MCNC: 182 MG/DL — HIGH (ref 70–99)
HCO3 BLDV-SCNC: 21 MMOL/L — SIGNIFICANT CHANGE UP (ref 20–27)
HCT VFR BLD CALC: 37.4 % — LOW (ref 39–50)
HCT VFR BLDV CALC: 38.3 % — LOW (ref 39–51)
HGB BLD-MCNC: 11.7 G/DL — LOW (ref 13–17)
HGB BLDV-MCNC: 12.4 G/DL — LOW (ref 13–17)
IMM GRANULOCYTES NFR BLD AUTO: 0.3 % — SIGNIFICANT CHANGE UP (ref 0–1.5)
INR BLD: 1.02 — SIGNIFICANT CHANGE UP (ref 0.88–1.17)
LACTATE BLDV-MCNC: 2.8 MMOL/L — HIGH (ref 0.5–2)
LYMPHOCYTES # BLD AUTO: 1.94 K/UL — SIGNIFICANT CHANGE UP (ref 1–3.3)
LYMPHOCYTES # BLD AUTO: 29 % — SIGNIFICANT CHANGE UP (ref 13–44)
MCHC RBC-ENTMCNC: 28.5 PG — SIGNIFICANT CHANGE UP (ref 27–34)
MCHC RBC-ENTMCNC: 31.3 % — LOW (ref 32–36)
MCV RBC AUTO: 91 FL — SIGNIFICANT CHANGE UP (ref 80–100)
MONOCYTES # BLD AUTO: 0.58 K/UL — SIGNIFICANT CHANGE UP (ref 0–0.9)
MONOCYTES NFR BLD AUTO: 8.7 % — SIGNIFICANT CHANGE UP (ref 2–14)
NEUTROPHILS # BLD AUTO: 3.95 K/UL — SIGNIFICANT CHANGE UP (ref 1.8–7.4)
NEUTROPHILS NFR BLD AUTO: 58.9 % — SIGNIFICANT CHANGE UP (ref 43–77)
NRBC # FLD: 0 K/UL — SIGNIFICANT CHANGE UP (ref 0–0)
NT-PROBNP SERPL-SCNC: 945.2 PG/ML — SIGNIFICANT CHANGE UP
PCO2 BLDV: 49 MMHG — SIGNIFICANT CHANGE UP (ref 41–51)
PH BLDV: 7.31 PH — LOW (ref 7.32–7.43)
PLATELET # BLD AUTO: 200 K/UL — SIGNIFICANT CHANGE UP (ref 150–400)
PMV BLD: 9.8 FL — SIGNIFICANT CHANGE UP (ref 7–13)
PO2 BLDV: 22 MMHG — LOW (ref 35–40)
POTASSIUM BLDV-SCNC: 3.7 MMOL/L — SIGNIFICANT CHANGE UP (ref 3.4–4.5)
POTASSIUM SERPL-MCNC: 3.4 MMOL/L — LOW (ref 3.5–5.3)
POTASSIUM SERPL-SCNC: 3.4 MMOL/L — LOW (ref 3.5–5.3)
PROT SERPL-MCNC: 6.7 G/DL — SIGNIFICANT CHANGE UP (ref 6–8.3)
PROTHROM AB SERPL-ACNC: 11.3 SEC — SIGNIFICANT CHANGE UP (ref 9.8–13.1)
RBC # BLD: 4.11 M/UL — LOW (ref 4.2–5.8)
RBC # FLD: 14.6 % — HIGH (ref 10.3–14.5)
SAO2 % BLDV: 32 % — LOW (ref 60–85)
SODIUM SERPL-SCNC: 140 MMOL/L — SIGNIFICANT CHANGE UP (ref 135–145)
TROPONIN T, HIGH SENSITIVITY: 13 NG/L — SIGNIFICANT CHANGE UP (ref ?–14)
TROPONIN T, HIGH SENSITIVITY: 15 NG/L — SIGNIFICANT CHANGE UP (ref ?–14)
WBC # BLD: 6.7 K/UL — SIGNIFICANT CHANGE UP (ref 3.8–10.5)
WBC # FLD AUTO: 6.7 K/UL — SIGNIFICANT CHANGE UP (ref 3.8–10.5)

## 2019-06-18 PROCEDURE — 71045 X-RAY EXAM CHEST 1 VIEW: CPT | Mod: 26

## 2019-06-18 PROCEDURE — 71275 CT ANGIOGRAPHY CHEST: CPT | Mod: 26

## 2019-06-18 RX ORDER — IPRATROPIUM/ALBUTEROL SULFATE 18-103MCG
3 AEROSOL WITH ADAPTER (GRAM) INHALATION ONCE
Refills: 0 | Status: COMPLETED | OUTPATIENT
Start: 2019-06-18 | End: 2019-06-18

## 2019-06-18 RX ORDER — DIPHENHYDRAMINE HCL 50 MG
50 CAPSULE ORAL ONCE
Refills: 0 | Status: DISCONTINUED | OUTPATIENT
Start: 2019-06-18 | End: 2019-06-18

## 2019-06-18 RX ORDER — DIPHENHYDRAMINE HCL 50 MG
25 CAPSULE ORAL ONCE
Refills: 0 | Status: COMPLETED | OUTPATIENT
Start: 2019-06-18 | End: 2019-06-18

## 2019-06-18 RX ORDER — POTASSIUM CHLORIDE 20 MEQ
20 PACKET (EA) ORAL ONCE
Refills: 0 | Status: COMPLETED | OUTPATIENT
Start: 2019-06-18 | End: 2019-06-18

## 2019-06-18 RX ORDER — AZITHROMYCIN 500 MG/1
500 TABLET, FILM COATED ORAL ONCE
Refills: 0 | Status: COMPLETED | OUTPATIENT
Start: 2019-06-18 | End: 2019-06-18

## 2019-06-18 RX ADMIN — AZITHROMYCIN 500 MILLIGRAM(S): 500 TABLET, FILM COATED ORAL at 23:16

## 2019-06-18 RX ADMIN — Medication 125 MILLIGRAM(S): at 17:22

## 2019-06-18 RX ADMIN — Medication 20 MILLIEQUIVALENT(S): at 22:21

## 2019-06-18 RX ADMIN — Medication 25 MILLIGRAM(S): at 20:36

## 2019-06-18 RX ADMIN — Medication 3 MILLILITER(S): at 17:10

## 2019-06-18 RX ADMIN — Medication 3 MILLILITER(S): at 16:59

## 2019-06-18 RX ADMIN — Medication 3 MILLILITER(S): at 17:15

## 2019-06-18 NOTE — ED ADULT NURSE REASSESSMENT NOTE - NS ED NURSE REASSESS COMMENT FT1
Tele tech made RN aware that pt's HR went to 155 at 2258. Pt is resting comfortably at this time, . Dr. Bloch made aware. No new orders at this time.

## 2019-06-18 NOTE — ED PROVIDER NOTE - ATTENDING CONTRIBUTION TO CARE
No I performed the initial face to face bedside interview with this patient regarding history of present illness, review of symptoms and past medical, social and family history.  I completed an independent physical examination.  I was the initial provider who evaluated this patient.  The history, review of symptoms and examination was documented by me. I have discussed the patient’s plan of care and disposition with the resident

## 2019-06-18 NOTE — ED ADULT TRIAGE NOTE - CHIEF COMPLAINT QUOTE
from home by EMS for SOB started 1 hr ago, pt with severe SOB Neb. x 2 given with some improvements pt's Sat. 77% RA

## 2019-06-18 NOTE — ED ADULT NURSE REASSESSMENT NOTE - NS ED NURSE REASSESS COMMENT FT1
report received from SIENNA jane from break coverage. pt appears stable and comfortable. respirations even and unlabored, RR 18, sating 100% RA. lung sounds clear bilaterally. sinus rhythm on monitor. will continue to monitor.

## 2019-06-18 NOTE — ED ADULT NURSE NOTE - OBJECTIVE STATEMENT
Pt received in #a, aaox3 with c/o an acute onset of sob. Pt states that he suddenly developed sob ~1 hour prior to ED presentation. Pt denies cp, nausea, vomiting, dizziness, fever, chills or diaphoresis. Per triage note, pt had an o2 sat of 77% on RA. Pt monitored off of o2 and maintained an o2 sat between 98%-99% on ra without any increased respiratory effort. Pt on CM with continuous pulse oximetry, IV established, labs sent.

## 2019-06-18 NOTE — ED PROVIDER NOTE - OBJECTIVE STATEMENT
85 yr old male  HTN, DM, COPD, colon CA, melanoma, HX CABG, stents, Pulm emb off anticoag, c/o SOB, no CP, cant catch his breath. Presented similiarly with PE in past as per daughter. No fever , chills, no sputum production. 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   Received 3 duoneb by EMS with some improvement. Becomes very anxious with SOB.

## 2019-06-18 NOTE — ED PROVIDER NOTE - CLINICAL SUMMARY MEDICAL DECISION MAKING FREE TEXT BOX
Patient with DM, CAD, CABG. STents, colon CA melanoma,COPD, with hx of PE c/o SOB- concern for COPD exacerbation vs Pulmonary embolism, labs duonebs, steroids, CT angio- must wait for pretreatment.  Initial triage O2 sat 77, but immediately after in rm 98.

## 2019-06-19 DIAGNOSIS — J44.1 CHRONIC OBSTRUCTIVE PULMONARY DISEASE WITH (ACUTE) EXACERBATION: ICD-10-CM

## 2019-06-19 DIAGNOSIS — E11.9 TYPE 2 DIABETES MELLITUS WITHOUT COMPLICATIONS: ICD-10-CM

## 2019-06-19 DIAGNOSIS — I10 ESSENTIAL (PRIMARY) HYPERTENSION: ICD-10-CM

## 2019-06-19 DIAGNOSIS — Z29.9 ENCOUNTER FOR PROPHYLACTIC MEASURES, UNSPECIFIED: ICD-10-CM

## 2019-06-19 DIAGNOSIS — R06.02 SHORTNESS OF BREATH: ICD-10-CM

## 2019-06-19 DIAGNOSIS — E78.5 HYPERLIPIDEMIA, UNSPECIFIED: ICD-10-CM

## 2019-06-19 DIAGNOSIS — E27.9 DISORDER OF ADRENAL GLAND, UNSPECIFIED: ICD-10-CM

## 2019-06-19 LAB
ANION GAP SERPL CALC-SCNC: 16 MMO/L — HIGH (ref 7–14)
B PERT DNA SPEC QL NAA+PROBE: NOT DETECTED — SIGNIFICANT CHANGE UP
BUN SERPL-MCNC: 17 MG/DL — SIGNIFICANT CHANGE UP (ref 7–23)
C PNEUM DNA SPEC QL NAA+PROBE: NOT DETECTED — SIGNIFICANT CHANGE UP
CALCIUM SERPL-MCNC: 9.4 MG/DL — SIGNIFICANT CHANGE UP (ref 8.4–10.5)
CHLORIDE SERPL-SCNC: 107 MMOL/L — SIGNIFICANT CHANGE UP (ref 98–107)
CO2 SERPL-SCNC: 16 MMOL/L — LOW (ref 22–31)
CREAT SERPL-MCNC: 0.94 MG/DL — SIGNIFICANT CHANGE UP (ref 0.5–1.3)
FLUAV H1 2009 PAND RNA SPEC QL NAA+PROBE: NOT DETECTED — SIGNIFICANT CHANGE UP
FLUAV H1 RNA SPEC QL NAA+PROBE: NOT DETECTED — SIGNIFICANT CHANGE UP
FLUAV H3 RNA SPEC QL NAA+PROBE: NOT DETECTED — SIGNIFICANT CHANGE UP
FLUAV SUBTYP SPEC NAA+PROBE: NOT DETECTED — SIGNIFICANT CHANGE UP
FLUBV RNA SPEC QL NAA+PROBE: NOT DETECTED — SIGNIFICANT CHANGE UP
GLUCOSE BLDC GLUCOMTR-MCNC: 112 MG/DL — HIGH (ref 70–99)
GLUCOSE BLDC GLUCOMTR-MCNC: 116 MG/DL — HIGH (ref 70–99)
GLUCOSE BLDC GLUCOMTR-MCNC: 127 MG/DL — HIGH (ref 70–99)
GLUCOSE BLDC GLUCOMTR-MCNC: 146 MG/DL — HIGH (ref 70–99)
GLUCOSE BLDC GLUCOMTR-MCNC: 245 MG/DL — HIGH (ref 70–99)
GLUCOSE SERPL-MCNC: 154 MG/DL — HIGH (ref 70–99)
HADV DNA SPEC QL NAA+PROBE: NOT DETECTED — SIGNIFICANT CHANGE UP
HBA1C BLD-MCNC: 5.5 % — SIGNIFICANT CHANGE UP (ref 4–5.6)
HCOV PNL SPEC NAA+PROBE: SIGNIFICANT CHANGE UP
HCT VFR BLD CALC: 36.8 % — LOW (ref 39–50)
HGB BLD-MCNC: 11.9 G/DL — LOW (ref 13–17)
HMPV RNA SPEC QL NAA+PROBE: NOT DETECTED — SIGNIFICANT CHANGE UP
HPIV1 RNA SPEC QL NAA+PROBE: NOT DETECTED — SIGNIFICANT CHANGE UP
HPIV2 RNA SPEC QL NAA+PROBE: NOT DETECTED — SIGNIFICANT CHANGE UP
HPIV3 RNA SPEC QL NAA+PROBE: NOT DETECTED — SIGNIFICANT CHANGE UP
HPIV4 RNA SPEC QL NAA+PROBE: NOT DETECTED — SIGNIFICANT CHANGE UP
MAGNESIUM SERPL-MCNC: 1.7 MG/DL — SIGNIFICANT CHANGE UP (ref 1.6–2.6)
MCHC RBC-ENTMCNC: 28.7 PG — SIGNIFICANT CHANGE UP (ref 27–34)
MCHC RBC-ENTMCNC: 32.3 % — SIGNIFICANT CHANGE UP (ref 32–36)
MCV RBC AUTO: 88.7 FL — SIGNIFICANT CHANGE UP (ref 80–100)
NRBC # FLD: 0 K/UL — SIGNIFICANT CHANGE UP (ref 0–0)
PLATELET # BLD AUTO: 181 K/UL — SIGNIFICANT CHANGE UP (ref 150–400)
PMV BLD: 10 FL — SIGNIFICANT CHANGE UP (ref 7–13)
POTASSIUM SERPL-MCNC: 4.1 MMOL/L — SIGNIFICANT CHANGE UP (ref 3.5–5.3)
POTASSIUM SERPL-SCNC: 4.1 MMOL/L — SIGNIFICANT CHANGE UP (ref 3.5–5.3)
RBC # BLD: 4.15 M/UL — LOW (ref 4.2–5.8)
RBC # FLD: 14.7 % — HIGH (ref 10.3–14.5)
RSV RNA SPEC QL NAA+PROBE: NOT DETECTED — SIGNIFICANT CHANGE UP
RV+EV RNA SPEC QL NAA+PROBE: NOT DETECTED — SIGNIFICANT CHANGE UP
SODIUM SERPL-SCNC: 139 MMOL/L — SIGNIFICANT CHANGE UP (ref 135–145)
WBC # BLD: 8.72 K/UL — SIGNIFICANT CHANGE UP (ref 3.8–10.5)
WBC # FLD AUTO: 8.72 K/UL — SIGNIFICANT CHANGE UP (ref 3.8–10.5)

## 2019-06-19 PROCEDURE — 99223 1ST HOSP IP/OBS HIGH 75: CPT | Mod: AI

## 2019-06-19 PROCEDURE — 99223 1ST HOSP IP/OBS HIGH 75: CPT

## 2019-06-19 RX ORDER — INSULIN LISPRO 100/ML
VIAL (ML) SUBCUTANEOUS
Refills: 0 | Status: DISCONTINUED | OUTPATIENT
Start: 2019-06-19 | End: 2019-06-24

## 2019-06-19 RX ORDER — DEXTROSE 50 % IN WATER 50 %
25 SYRINGE (ML) INTRAVENOUS ONCE
Refills: 0 | Status: DISCONTINUED | OUTPATIENT
Start: 2019-06-19 | End: 2019-06-24

## 2019-06-19 RX ORDER — AMITRIPTYLINE HCL 25 MG
25 TABLET ORAL AT BEDTIME
Refills: 0 | Status: DISCONTINUED | OUTPATIENT
Start: 2019-06-19 | End: 2019-06-24

## 2019-06-19 RX ORDER — POLYETHYLENE GLYCOL 3350 17 G/17G
17 POWDER, FOR SOLUTION ORAL DAILY
Refills: 0 | Status: DISCONTINUED | OUTPATIENT
Start: 2019-06-19 | End: 2019-06-24

## 2019-06-19 RX ORDER — ALBUTEROL 90 UG/1
2 AEROSOL, METERED ORAL EVERY 6 HOURS
Refills: 0 | Status: DISCONTINUED | OUTPATIENT
Start: 2019-06-19 | End: 2019-06-19

## 2019-06-19 RX ORDER — DOCUSATE SODIUM 100 MG
100 CAPSULE ORAL THREE TIMES A DAY
Refills: 0 | Status: DISCONTINUED | OUTPATIENT
Start: 2019-06-19 | End: 2019-06-24

## 2019-06-19 RX ORDER — LUBIPROSTONE 24 UG/1
1 CAPSULE, GELATIN COATED ORAL
Qty: 0 | Refills: 0 | DISCHARGE

## 2019-06-19 RX ORDER — CYCLOSPORINE 0.5 MG/ML
1 EMULSION OPHTHALMIC
Qty: 0 | Refills: 0 | DISCHARGE

## 2019-06-19 RX ORDER — PREDNISOLONE SODIUM PHOSPHATE 1 %
1 DROPS OPHTHALMIC (EYE)
Qty: 0 | Refills: 0 | DISCHARGE

## 2019-06-19 RX ORDER — NEPAFENAC 3 MG/ML
1 SUSPENSION OPHTHALMIC
Qty: 0 | Refills: 0 | DISCHARGE

## 2019-06-19 RX ORDER — TAMSULOSIN HYDROCHLORIDE 0.4 MG/1
0.4 CAPSULE ORAL
Refills: 0 | Status: DISCONTINUED | OUTPATIENT
Start: 2019-06-19 | End: 2019-06-24

## 2019-06-19 RX ORDER — OFLOXACIN 0.3 %
1 DROPS OPHTHALMIC (EYE)
Qty: 0 | Refills: 0 | DISCHARGE

## 2019-06-19 RX ORDER — ISOSORBIDE MONONITRATE 60 MG/1
1 TABLET, EXTENDED RELEASE ORAL
Qty: 0 | Refills: 0 | DISCHARGE

## 2019-06-19 RX ORDER — ASPIRIN/CALCIUM CARB/MAGNESIUM 324 MG
81 TABLET ORAL DAILY
Refills: 0 | Status: DISCONTINUED | OUTPATIENT
Start: 2019-06-19 | End: 2019-06-24

## 2019-06-19 RX ORDER — METOPROLOL TARTRATE 50 MG
5 TABLET ORAL ONCE
Refills: 0 | Status: DISCONTINUED | OUTPATIENT
Start: 2019-06-19 | End: 2019-06-19

## 2019-06-19 RX ORDER — MIRTAZAPINE 45 MG/1
15 TABLET, ORALLY DISINTEGRATING ORAL AT BEDTIME
Refills: 0 | Status: DISCONTINUED | OUTPATIENT
Start: 2019-06-19 | End: 2019-06-24

## 2019-06-19 RX ORDER — LEVOTHYROXINE SODIUM 125 MCG
112 TABLET ORAL DAILY
Refills: 0 | Status: DISCONTINUED | OUTPATIENT
Start: 2019-06-19 | End: 2019-06-24

## 2019-06-19 RX ORDER — TAMSULOSIN HYDROCHLORIDE 0.4 MG/1
1 CAPSULE ORAL
Qty: 0 | Refills: 0 | DISCHARGE

## 2019-06-19 RX ORDER — INSULIN LISPRO 100/ML
VIAL (ML) SUBCUTANEOUS AT BEDTIME
Refills: 0 | Status: DISCONTINUED | OUTPATIENT
Start: 2019-06-19 | End: 2019-06-24

## 2019-06-19 RX ORDER — ISOSORBIDE MONONITRATE 60 MG/1
60 TABLET, EXTENDED RELEASE ORAL DAILY
Refills: 0 | Status: DISCONTINUED | OUTPATIENT
Start: 2019-06-19 | End: 2019-06-24

## 2019-06-19 RX ORDER — METOPROLOL TARTRATE 50 MG
50 TABLET ORAL DAILY
Refills: 0 | Status: DISCONTINUED | OUTPATIENT
Start: 2019-06-19 | End: 2019-06-21

## 2019-06-19 RX ORDER — SODIUM CHLORIDE 9 MG/ML
1000 INJECTION, SOLUTION INTRAVENOUS
Refills: 0 | Status: DISCONTINUED | OUTPATIENT
Start: 2019-06-19 | End: 2019-06-24

## 2019-06-19 RX ORDER — SENNA PLUS 8.6 MG/1
2 TABLET ORAL AT BEDTIME
Refills: 0 | Status: DISCONTINUED | OUTPATIENT
Start: 2019-06-19 | End: 2019-06-24

## 2019-06-19 RX ORDER — IPRATROPIUM/ALBUTEROL SULFATE 18-103MCG
3 AEROSOL WITH ADAPTER (GRAM) INHALATION EVERY 6 HOURS
Refills: 0 | Status: DISCONTINUED | OUTPATIENT
Start: 2019-06-19 | End: 2019-06-19

## 2019-06-19 RX ORDER — METOPROLOL TARTRATE 50 MG
25 TABLET ORAL DAILY
Refills: 0 | Status: DISCONTINUED | OUTPATIENT
Start: 2019-06-19 | End: 2019-06-19

## 2019-06-19 RX ORDER — MIRTAZAPINE 45 MG/1
1 TABLET, ORALLY DISINTEGRATING ORAL
Qty: 0 | Refills: 0 | DISCHARGE

## 2019-06-19 RX ORDER — TIOTROPIUM BROMIDE 18 UG/1
2 CAPSULE ORAL; RESPIRATORY (INHALATION)
Qty: 0 | Refills: 0 | DISCHARGE

## 2019-06-19 RX ORDER — LEVOTHYROXINE SODIUM 125 MCG
1 TABLET ORAL
Qty: 0 | Refills: 0 | DISCHARGE

## 2019-06-19 RX ORDER — ISOSORBIDE MONONITRATE 60 MG/1
60 TABLET, EXTENDED RELEASE ORAL
Qty: 0 | Refills: 0 | DISCHARGE

## 2019-06-19 RX ORDER — TIOTROPIUM BROMIDE 18 UG/1
1 CAPSULE ORAL; RESPIRATORY (INHALATION) DAILY
Refills: 0 | Status: DISCONTINUED | OUTPATIENT
Start: 2019-06-19 | End: 2019-06-24

## 2019-06-19 RX ORDER — ENOXAPARIN SODIUM 100 MG/ML
40 INJECTION SUBCUTANEOUS EVERY 24 HOURS
Refills: 0 | Status: DISCONTINUED | OUTPATIENT
Start: 2019-06-19 | End: 2019-06-23

## 2019-06-19 RX ORDER — AMITRIPTYLINE HCL 25 MG
1 TABLET ORAL
Qty: 0 | Refills: 0 | DISCHARGE

## 2019-06-19 RX ORDER — METOPROLOL TARTRATE 50 MG
1 TABLET ORAL
Qty: 0 | Refills: 0 | DISCHARGE

## 2019-06-19 RX ORDER — DEXTROSE 50 % IN WATER 50 %
15 SYRINGE (ML) INTRAVENOUS ONCE
Refills: 0 | Status: DISCONTINUED | OUTPATIENT
Start: 2019-06-19 | End: 2019-06-24

## 2019-06-19 RX ORDER — FLUTICASONE PROPIONATE AND SALMETEROL 50; 250 UG/1; UG/1
1 POWDER ORAL; RESPIRATORY (INHALATION)
Qty: 0 | Refills: 0 | DISCHARGE

## 2019-06-19 RX ORDER — BUDESONIDE AND FORMOTEROL FUMARATE DIHYDRATE 160; 4.5 UG/1; UG/1
2 AEROSOL RESPIRATORY (INHALATION)
Refills: 0 | Status: DISCONTINUED | OUTPATIENT
Start: 2019-06-19 | End: 2019-06-24

## 2019-06-19 RX ORDER — LEVALBUTEROL 1.25 MG/.5ML
0.63 SOLUTION, CONCENTRATE RESPIRATORY (INHALATION) EVERY 6 HOURS
Refills: 0 | Status: DISCONTINUED | OUTPATIENT
Start: 2019-06-19 | End: 2019-06-21

## 2019-06-19 RX ORDER — AZITHROMYCIN 500 MG/1
500 TABLET, FILM COATED ORAL EVERY 24 HOURS
Refills: 0 | Status: DISCONTINUED | OUTPATIENT
Start: 2019-06-19 | End: 2019-06-20

## 2019-06-19 RX ORDER — ATORVASTATIN CALCIUM 80 MG/1
40 TABLET, FILM COATED ORAL AT BEDTIME
Refills: 0 | Status: DISCONTINUED | OUTPATIENT
Start: 2019-06-19 | End: 2019-06-24

## 2019-06-19 RX ORDER — PREDNISOLONE SODIUM PHOSPHATE 1 %
1 DROPS OPHTHALMIC (EYE)
Refills: 0 | Status: DISCONTINUED | OUTPATIENT
Start: 2019-06-19 | End: 2019-06-24

## 2019-06-19 RX ORDER — GLUCAGON INJECTION, SOLUTION 0.5 MG/.1ML
1 INJECTION, SOLUTION SUBCUTANEOUS ONCE
Refills: 0 | Status: DISCONTINUED | OUTPATIENT
Start: 2019-06-19 | End: 2019-06-24

## 2019-06-19 RX ORDER — DEXTROSE 50 % IN WATER 50 %
12.5 SYRINGE (ML) INTRAVENOUS ONCE
Refills: 0 | Status: DISCONTINUED | OUTPATIENT
Start: 2019-06-19 | End: 2019-06-24

## 2019-06-19 RX ORDER — ACETAMINOPHEN 500 MG
650 TABLET ORAL EVERY 6 HOURS
Refills: 0 | Status: DISCONTINUED | OUTPATIENT
Start: 2019-06-19 | End: 2019-06-24

## 2019-06-19 RX ADMIN — LEVALBUTEROL 0.63 MILLIGRAM(S): 1.25 SOLUTION, CONCENTRATE RESPIRATORY (INHALATION) at 21:17

## 2019-06-19 RX ADMIN — Medication 40 MILLIGRAM(S): at 17:30

## 2019-06-19 RX ADMIN — Medication 25 MILLIGRAM(S): at 22:49

## 2019-06-19 RX ADMIN — Medication 1 DROP(S): at 20:03

## 2019-06-19 RX ADMIN — MIRTAZAPINE 15 MILLIGRAM(S): 45 TABLET, ORALLY DISINTEGRATING ORAL at 21:42

## 2019-06-19 RX ADMIN — Medication 650 MILLIGRAM(S): at 07:57

## 2019-06-19 RX ADMIN — Medication 50 MILLIGRAM(S): at 12:01

## 2019-06-19 RX ADMIN — Medication 100 MILLIGRAM(S): at 21:42

## 2019-06-19 RX ADMIN — ENOXAPARIN SODIUM 40 MILLIGRAM(S): 100 INJECTION SUBCUTANEOUS at 10:14

## 2019-06-19 RX ADMIN — ISOSORBIDE MONONITRATE 60 MILLIGRAM(S): 60 TABLET, EXTENDED RELEASE ORAL at 12:01

## 2019-06-19 RX ADMIN — AZITHROMYCIN 250 MILLIGRAM(S): 500 TABLET, FILM COATED ORAL at 10:13

## 2019-06-19 RX ADMIN — BUDESONIDE AND FORMOTEROL FUMARATE DIHYDRATE 2 PUFF(S): 160; 4.5 AEROSOL RESPIRATORY (INHALATION) at 10:13

## 2019-06-19 RX ADMIN — Medication 112 MICROGRAM(S): at 10:14

## 2019-06-19 RX ADMIN — Medication 81 MILLIGRAM(S): at 12:00

## 2019-06-19 RX ADMIN — BUDESONIDE AND FORMOTEROL FUMARATE DIHYDRATE 2 PUFF(S): 160; 4.5 AEROSOL RESPIRATORY (INHALATION) at 21:52

## 2019-06-19 RX ADMIN — ATORVASTATIN CALCIUM 40 MILLIGRAM(S): 80 TABLET, FILM COATED ORAL at 21:42

## 2019-06-19 RX ADMIN — POLYETHYLENE GLYCOL 3350 17 GRAM(S): 17 POWDER, FOR SOLUTION ORAL at 12:00

## 2019-06-19 RX ADMIN — Medication 100 MILLIGRAM(S): at 17:30

## 2019-06-19 RX ADMIN — TAMSULOSIN HYDROCHLORIDE 0.4 MILLIGRAM(S): 0.4 CAPSULE ORAL at 17:30

## 2019-06-19 RX ADMIN — LEVALBUTEROL 0.63 MILLIGRAM(S): 1.25 SOLUTION, CONCENTRATE RESPIRATORY (INHALATION) at 12:01

## 2019-06-19 RX ADMIN — SENNA PLUS 2 TABLET(S): 8.6 TABLET ORAL at 21:42

## 2019-06-19 RX ADMIN — Medication 650 MILLIGRAM(S): at 06:10

## 2019-06-19 RX ADMIN — TIOTROPIUM BROMIDE 1 CAPSULE(S): 18 CAPSULE ORAL; RESPIRATORY (INHALATION) at 10:14

## 2019-06-19 NOTE — H&P ADULT - NEGATIVE NEUROLOGICAL SYMPTOMS
no focal seizures/no syncope/no vertigo/no loss of sensation/no confusion/no loss of consciousness/no generalized seizures/no headache/no weakness/no transient paralysis

## 2019-06-19 NOTE — H&P ADULT - ASSESSMENT
85 year old male pmh of HTN, DM Type II not on insulin, Colon Ca s/p partial colectomy (3/2019)  Melanoma s/p keytruda, PE s/p Xarelto (reported to have stopped 3 months ago) HTN, HLD, COPD not on home O2, CAD s/p Stent and CABG presents with cough and inability to catch breath admitted for possible COPD exacerbation noted to have tachycardic event r/o Atrial fibrillation.

## 2019-06-19 NOTE — H&P ADULT - PROBLEM SELECTOR PLAN 2
Patient had an episode of tachycardia with HR up to 159, EKG was performed and is Afib vs ST c PAC. - Consider EP input to review EKG  During history bedside telemetry was sinus rhythm with occasional PAC  Monitor on Tele  ?If patient's presenting symptoms related to paroxysmal Afib?  If Afib CHADS-Vasc - 5 and would benefit from Anticoagulation as long as benefits > Risk of bleeding given CA history Patient had an episode of tachycardia with HR up to 159, EKG was performed and is Afib vs ST c PAC. - Consider EP input to review EKG  During history bedside telemetry was sinus rhythm with occasional PAC  Monitor on Tele  ?If patient's presenting symptoms related to paroxysmal Afib?  If Afib CHADS-Vasc - 5 and would benefit from Anticoagulation as long as benefits > Risk of bleeding given CA history  Reviewed with Patients Daughter - Cate Huertas MD - Patient in distant past had episode of AF. Also was discontinued Xarelto due to epistaxis - she would agree with anticoagulation at this time. Patient had an episode of tachycardia with HR up to 159, EKG was performed and is Afib vs ST c PAC. - Consider EP input to review EKG  During history bedside telemetry was sinus rhythm with occasional PAC  Monitor on Tele  ?If patient's presenting symptoms related to paroxysmal Afib?  If Afib CHADS-Vasc - 5 and would benefit from Anticoagulation as long as benefits > Risk of bleeding given CA history  Reviewed with Patients Daughter - Cate Huertas - Patient in distant past had episode of AF. Also was discontinued Xarelto due to epistaxis - she would agree with anticoagulation at this time.

## 2019-06-19 NOTE — H&P ADULT - NSHPSOCIALHISTORY_GEN_ALL_CORE
LIves with wife  Former smoker, quit 35 years ago, 30 pack year history  no etoh, no illicit drug use

## 2019-06-19 NOTE — H&P ADULT - ATTENDING COMMENTS
Patient seen and examined in ED .   Agree with Above A/p by tele REBECCA Ren.   [place]  phone # [phone]- Marsha  85 year old male pmh of HTN, DM Type II not on insulin, Colon Ca s/p partial colectomy (3/2019)  Melanoma s/p Keytruda- 2 years ago, , PE s/p Xarelto (reported to have stopped 3 months ago)  HLD, COPD not on home O2, CAD s/p Stent and CABG presents with cough and inability to catch breath. Patient states that for 3 days he has had a non-productive cough that occurs often that makes it very difficult to catch his breath. Cough has been occurring more frequently and is associated with chest discomfort.  In Ed patient treated with nebulizes/ Iv Solumedrol and Iv Azythro.   . He was noted to have improved oxygenation overnight and was able to transition from FM to NC oxygen.  He was also noted to have rapid heart rate  and Cardiology consult requested.  PE VS T98, P98, /60, R19, Sat 98 % on NC  Lung- no wheezing at this time, Cor RRR no m/r/g, Abd - soft n/t, ext no e/c/c.  Labs sig for CTA of chest- no PE. RVP negative  WBC 8.7, K 3.4, proBNP 945, Ph 7.3 and lactate 2.8  A/P  #COPD ExB- c/w solumedrol 40 mg q12 x 24 then asses for po pred, Xopenex prn for SOB and c/w Advair.  Consider pulmonary consult for out patient f/u on d/c. CXR- clear. monitor probmp and lactate level.  #Afib-with inc Hr in Ed, now 98. will add Cardizem 30 mg q6 and hold SBP <100, Hr <60 . Will have Cardiology eval of EKG- may need anticoagulation started.  CAD- c/w Troprol Xl, Lipitor, c/w Imdur and nitrostat. notes Chest pain with cough- CXR- clear.  Hypothyroidism- c/w Levothyroxine. monitor tsh level.  dvt prop- Lovenox 40 mg sq. Patient seen and examined in ED .   Agree with Above A/p by tele REBECCA Ren.   [place]  phone # [phone]- Marsha  85 year old male pmh of HTN, DM Type II not on insulin, Colon Ca s/p partial colectomy (3/2019)  Melanoma s/p Keytruda- 2 years ago, , PE s/p Xarelto (reported to have stopped 3 months ago)  HLD, COPD not on home O2, CAD s/p Stent and CABG presents with cough and inability to catch breath. Patient states that for 3 days he has had a non-productive cough that occurs often that makes it very difficult to catch his breath. Cough has been occurring more frequently and is associated with chest discomfort.  In Ed patient treated with nebulizes/ Iv Solumedrol and Iv Azythro.   . He was noted to have improved oxygenation overnight and was able to transition from FM to NC oxygen.  He was also noted to have rapid heart rate  and Cardiology consult requested.  PE VS T98, P98, /60, R19, Sat 98 % on NC  Lung- no wheezing at this time, Cor RRR no m/r/g, Abd - soft n/t, ext no e/c/c.  Labs sig for CTA of chest- no PE. RVP negative  WBC 8.7, K 3.4, proBNP 945, Ph 7.3 and lactate 2.8  A/P  #COPD ExB- c/w solumedrol 40 mg q12 x 24 then asses for po pred, Xopenex prn for SOB and c/w Advair.   pulmonary consult for out patient f/u on d/c. CXR- clear. monitor probmp and lactate level.  #Afib-with inc Hr in Ed, now 98. will add Cardizem 30 mg q6 and hold SBP <100, Hr <60 . Will have Cardiology eval of EKG- may need anticoagulation started.  CAD- c/w Troprol Xl, Lipitor, c/w Imdur and nitrostat. notes Chest pain with cough- CXR- clear.  Hypothyroidism- c/w Levothyroxine. monitor tsh level.  dvt prop- Lovenox 40 mg sq. Patient seen and examined in ED .   Agree with Above A/p by tele REBECCA Ren.   [place]  phone # [phone]- Marsha  85 year old male pmh of HTN, DM Type II not on insulin, Colon Ca s/p partial colectomy (3/2019)  Melanoma s/p Keytruda- 2 years ago, , PE s/p Xarelto (reported to have stopped 3 months ago)  HLD, COPD not on home O2, CAD s/p Stent and CABG presents with cough and inability to catch breath. Patient states that for 3 days he has had a non-productive cough that occurs often that makes it very difficult to catch his breath. Cough has been occurring more frequently and is associated with chest discomfort.  In Ed patient treated with nebulizes/ Iv Solumedrol and Iv Azythro.   . He was noted to have improved oxygenation overnight and was able to transition from FM to NC oxygen.  He was also noted to have rapid heart rate  and Cardiology consult requested.  PE VS T98, P98, /60, R19, Sat 98 % on NC  Lung- no wheezing at this time, Cor RRR no m/r/g, Abd - soft n/t, ext no e/c/c.  Labs sig for CTA of chest- no PE. RVP negative  WBC 8.7, K 3.4, proBNP 945, Ph 7.3 and lactate 2.8  A/P  #COPD ExB- c/w solumedrol 40 mg q12 x 24 then asses for po pred, Xopenex prn for SOB and c/w Advair.   pulmonary consult for out patient f/u on d/c. CXR- clear. monitor probmp and lactate level.  #Afib-with inc Hr in Ed, now 98. will add Cardizem 30 mg q6 and hold SBP <100, Hr <60 . Will have Cardiology eval of EKG- may need anticoagulation started.  CAD- c/w Troprol Xl, Lipitor, c/w Imdur and nitrostat. notes Chest pain with cough- CXR- clear.  Hypothyroidism- c/w Levothyroxine. monitor tsh level.  # 2 cm Adrenal lesion- monitor aldosterone and cortisol in AM. Consider endocrine consult.  dvt prop- Lovenox 40 mg sq.

## 2019-06-19 NOTE — ED ADULT NURSE REASSESSMENT NOTE - NS ED NURSE REASSESS COMMENT FT1
Pt maintaining well, respirations are even & unlabored. Pt denies chest pain, dyspnea and all other acute complaints. Report given to SIENNA Sousa in ESSU 2

## 2019-06-19 NOTE — CONSULT NOTE ADULT - ASSESSMENT
Patient is an 85 year old man with HTN, DM2 not on insulin, Colon Ca s/p partial colectomy (3/2019)  Melanoma s/p keytruda, PE s/p Xarelto (reported to have stopped 3 months ago)  hyperlipidemia, COPD not on home O2, CAD s/p Stent and CABG presents with cough and inability to catch breath. Patient states that for 3 days he has had a non-productive cough that occurs often that makes it very difficult to catch his breath. Cough has been occurring more frequently and is associated with chest discomfort. He notes that one night ago slept with window open and thinks he got a cold because he has some pain when swallowing in his throat.  He has been constipated for 3 days and when he bears down to have a BM causes nosebleed.  +intermittent palpitations. When EMS was called he received 3 nebulizers and oxygen and felt much better.     Upon initial presentation to ED O2 on room air was 77% which quickly improved to 98% with 2L of O2.  In the ED, 12-lead ECG noted atrial fibrillation with a rapid ventricular rate ~130 bpm.  He spontaneously converted back to sinus rhythm by the time I saw him.  He no longer complains of having palpitations, and currently denies having chest discomfort.  His main complaint is dyspnea, but reports that he feels better now.    He also mentioned that he follows with Dr. Cuevas, who is his cardiologist on [place], and had recently undergone a cardiac workup including an echocardiogram that was unremarkable as per patient.    On physical examination, he was noted to be in SR on telemetry.  He was in NAD, laying flat in bed, exhibited a Grade 2-3/6 SM, and had coarse breath sounds bilaterally on lung exam.  No LE edema noted.    His last cardiac assessment here included a cardiac catheterization in 2017 which demonstrated patent grafts and an echocardiogram that demonstrated normal LVEF.    At this time, will recommend obtaining an echocardiogram and continuing to monitor on telemetry.  His CHADSVASC score is 6.  He may very need to be started on anticoagulation pending further evaluation and consultations with his GI especially with recent colectomy for colon cancer, and findings on telemetry monitoring,  Of note, he was previously on anticoagulation for PE.    Will arrange for ischemic evaluation pending echocardiogram and symptoms.

## 2019-06-19 NOTE — H&P ADULT - PROBLEM SELECTOR PLAN 7
IMPROVE VTE Individual Risk Assessment        RISK                                                          Points  [  ] Previous VTE                                               3  [  ] Thrombophilia                                            2  [  ] Lower limb paresis/paralysis                    2    [ x ] Active Cancer (in last 6 months)              2   [ x ] Immobilization > 24 hrs                             1  [  ] ICU/CCU stay > 24 hours                            1  [  x] Age > 60                                                        1                                            Total Score ____3_____

## 2019-06-19 NOTE — H&P ADULT - NSICDXPASTMEDICALHX_GEN_ALL_CORE_FT
PAST MEDICAL HISTORY:  Asthma dx 1996, no intubations or hospitalizations    BPH (benign prostatic hyperplasia)     CAD (coronary artery disease) s/p PCI and CABG    Cataract, bilateral     Colon cancer polyp removed    Diabetes Type II not on long-term Insulin without complication    HTN (hypertension)     MI (myocardial infarction)     Nephrolithiasis

## 2019-06-19 NOTE — H&P ADULT - NSHPLABSRESULTS_GEN_ALL_CORE
11.7   6.70  )-----------( 200      ( 18 Jun 2019 16:00 )             37.4     06-18    140  |  108<H>  |  14  ----------------------------<  182<H>  3.4<L>   |  18<L>  |  0.90    Ca    8.9      18 Jun 2019 16:00    TPro  6.7  /  Alb  3.9  /  TBili  0.5  /  DBili  x   /  AST  18  /  ALT  16  /  AlkPhos  61  06-18    CTPA No central or lobar acute pulmonary arterial embolus. Nondiagnostic study   for acute pulmonary arterial embolus of the segmental and subsegmental   pulmonary arteries.    Indeterminate 2 cm left adrenal lesion.    1.7 cm indeterminate left renal lesion.      EKG #1 SR @ 95 c PVC  EKG #2 Afib @ 159  Trop 15 > 13  .2

## 2019-06-19 NOTE — H&P ADULT - RS GEN PE MLT RESP DETAILS PC
respirations non-labored/breath sounds equal/no rhonchi/no wheezes/airway patent/good air movement/no rales

## 2019-06-19 NOTE — H&P ADULT - NSICDXPASTSURGICALHX_GEN_ALL_CORE_FT
PAST SURGICAL HISTORY:  Adenoma of prostate     H/O colonoscopy removed polyp    History of cholecystectomy     S/P CABG x 3 4/1997

## 2019-06-19 NOTE — H&P ADULT - HISTORY OF PRESENT ILLNESS
85 year old male pmh of HTN, DM Type II not on insulin, Colon Ca s/p partial colectomy (3/2019)  Melanoma s/p keytruda, PE s/p Xarelto (reported to have stopped 3 months ago) HTN, HLD, COPD not on home O2, CAD s/p Stent and CABG presents with cough and inability to catch breath. Patient states that for 3 days he has had a non-productive cough that occurs often that makes it very difficult to catch his breath. Cough has been occurring more frequently and is associated with chest discomfort. He notes that one night ago slept with window open and thinks he got a cold because he has some pain when swallowing in his throat.  He has been constipated for 3 days and when he bears down to have a BM causes nosebleed.  +intermittent palpitations. When EMS was called he received 3 nebulizers and oxygen and felt much better. Upon initial presentation to ED O2 on room air was 77 which quickly improved to 98% with 2L of O2.  Denies any history of Atrial fibrillation.  Denies maryanne chest pain, abd pain, fever, chills, nausea, vomiting, diarrhea, brbpr, melena, dysuria, hematuria, dizziness, syncope, fall, head trauma, loc, visual change, hearing change, sick contact, travel, SOB, SORIANO. 85 year old male pmh of HTN, DM Type II not on insulin, Colon Ca s/p partial colectomy (3/2019)  Melanoma s/p keytruda, PE s/p Xarelto (reported to have stopped 3 months ago)  HLD, COPD not on home O2, CAD s/p Stent and CABG presents with cough and inability to catch breath. Patient states that for 3 days he has had a non-productive cough that occurs often that makes it very difficult to catch his breath. Cough has been occurring more frequently and is associated with chest discomfort. He notes that one night ago slept with window open and thinks he got a cold because he has some pain when swallowing in his throat.  He has been constipated for 3 days and when he bears down to have a BM causes nosebleed.  +intermittent palpitations. When EMS was called he received 3 nebulizers and oxygen and felt much better. Upon initial presentation to ED O2 on room air was 77 which quickly improved to 98% with 2L of O2.  Denies any history of Atrial fibrillation.  Denies maryanne chest pain, abd pain, fever, chills, nausea, vomiting, diarrhea, brbpr, melena, dysuria, hematuria, dizziness, syncope, fall, head trauma, loc, visual change, hearing change, sick contact, travel, SOB, SORIANO.

## 2019-06-19 NOTE — H&P ADULT - PROBLEM SELECTOR PLAN 1
Admitted to medicine  Patient denies outright SOB but has cough that causes difficulty to "catch breath"  Initially hypoxic to 77% but quickly improved and currently satted well, 99% on room air while taking history - also noted to have a tachycardic event  up to 159bpm, possibly Afib vs ST c PAC's   CTPA completed and without PE though was non-diagnostic  for segmental and subsegmental PE.   RVP was negative  BNP was 945.2 - patient does not appear grossly volume overloaded at this time Admitted to medicine  Patient denies outright SOB but has cough that causes difficulty to "catch breath"  Initially hypoxic to 77% but quickly improved and currently satted well, 99% on room air while taking history - also noted to have a tachycardic event  up to 159bpm, possibly Afib vs ST c PAC's   CTPA completed and without PE though was non-diagnostic  for segmental and subsegmental PE.   RVP was negative  BNP was 945.2 - patient does not appear grossly volume overloaded at this time  IV Solumedrol 40mg BID x 24 hours and Azithromycin 500mg IV qD for now  Xopenex q6

## 2019-06-20 DIAGNOSIS — Z71.89 OTHER SPECIFIED COUNSELING: ICD-10-CM

## 2019-06-20 LAB
ANION GAP SERPL CALC-SCNC: 11 MMO/L — SIGNIFICANT CHANGE UP (ref 7–14)
BUN SERPL-MCNC: 26 MG/DL — HIGH (ref 7–23)
CALCIUM SERPL-MCNC: 9.1 MG/DL — SIGNIFICANT CHANGE UP (ref 8.4–10.5)
CHLORIDE SERPL-SCNC: 108 MMOL/L — HIGH (ref 98–107)
CHOLEST SERPL-MCNC: 130 MG/DL — SIGNIFICANT CHANGE UP (ref 120–199)
CO2 SERPL-SCNC: 20 MMOL/L — LOW (ref 22–31)
CORTIS SERPL-MCNC: 5.9 UG/DL — SIGNIFICANT CHANGE UP (ref 2.7–18.4)
CREAT SERPL-MCNC: 0.96 MG/DL — SIGNIFICANT CHANGE UP (ref 0.5–1.3)
GLUCOSE BLDC GLUCOMTR-MCNC: 131 MG/DL — HIGH (ref 70–99)
GLUCOSE SERPL-MCNC: 147 MG/DL — HIGH (ref 70–99)
HCT VFR BLD CALC: 34.9 % — LOW (ref 39–50)
HDLC SERPL-MCNC: 35 MG/DL — SIGNIFICANT CHANGE UP (ref 35–55)
HGB BLD-MCNC: 11.2 G/DL — LOW (ref 13–17)
LIPID PNL WITH DIRECT LDL SERPL: 92 MG/DL — SIGNIFICANT CHANGE UP
MAGNESIUM SERPL-MCNC: 2 MG/DL — SIGNIFICANT CHANGE UP (ref 1.6–2.6)
MCHC RBC-ENTMCNC: 29.3 PG — SIGNIFICANT CHANGE UP (ref 27–34)
MCHC RBC-ENTMCNC: 32.1 % — SIGNIFICANT CHANGE UP (ref 32–36)
MCV RBC AUTO: 91.4 FL — SIGNIFICANT CHANGE UP (ref 80–100)
NRBC # FLD: 0 K/UL — SIGNIFICANT CHANGE UP (ref 0–0)
PLATELET # BLD AUTO: 191 K/UL — SIGNIFICANT CHANGE UP (ref 150–400)
PMV BLD: 10.3 FL — SIGNIFICANT CHANGE UP (ref 7–13)
POTASSIUM SERPL-MCNC: 4.4 MMOL/L — SIGNIFICANT CHANGE UP (ref 3.5–5.3)
POTASSIUM SERPL-SCNC: 4.4 MMOL/L — SIGNIFICANT CHANGE UP (ref 3.5–5.3)
RBC # BLD: 3.82 M/UL — LOW (ref 4.2–5.8)
RBC # FLD: 15.3 % — HIGH (ref 10.3–14.5)
SODIUM SERPL-SCNC: 139 MMOL/L — SIGNIFICANT CHANGE UP (ref 135–145)
TRIGL SERPL-MCNC: 68 MG/DL — SIGNIFICANT CHANGE UP (ref 10–149)
TSH SERPL-MCNC: < 0.1 UIU/ML — LOW (ref 0.27–4.2)
WBC # BLD: 13.57 K/UL — HIGH (ref 3.8–10.5)
WBC # FLD AUTO: 13.57 K/UL — HIGH (ref 3.8–10.5)

## 2019-06-20 PROCEDURE — 99232 SBSQ HOSP IP/OBS MODERATE 35: CPT

## 2019-06-20 PROCEDURE — 93306 TTE W/DOPPLER COMPLETE: CPT | Mod: 26

## 2019-06-20 PROCEDURE — 99233 SBSQ HOSP IP/OBS HIGH 50: CPT

## 2019-06-20 RX ORDER — AZITHROMYCIN 500 MG/1
500 TABLET, FILM COATED ORAL ONCE
Refills: 0 | Status: COMPLETED | OUTPATIENT
Start: 2019-06-21 | End: 2019-06-21

## 2019-06-20 RX ADMIN — TAMSULOSIN HYDROCHLORIDE 0.4 MILLIGRAM(S): 0.4 CAPSULE ORAL at 17:25

## 2019-06-20 RX ADMIN — SENNA PLUS 2 TABLET(S): 8.6 TABLET ORAL at 21:49

## 2019-06-20 RX ADMIN — Medication 100 MILLIGRAM(S): at 11:20

## 2019-06-20 RX ADMIN — Medication 25 MILLIGRAM(S): at 21:49

## 2019-06-20 RX ADMIN — LEVALBUTEROL 0.63 MILLIGRAM(S): 1.25 SOLUTION, CONCENTRATE RESPIRATORY (INHALATION) at 22:06

## 2019-06-20 RX ADMIN — Medication 50 MILLIGRAM(S): at 06:33

## 2019-06-20 RX ADMIN — Medication 1 DROP(S): at 07:51

## 2019-06-20 RX ADMIN — Medication 1 DROP(S): at 13:47

## 2019-06-20 RX ADMIN — ISOSORBIDE MONONITRATE 60 MILLIGRAM(S): 60 TABLET, EXTENDED RELEASE ORAL at 11:19

## 2019-06-20 RX ADMIN — ENOXAPARIN SODIUM 40 MILLIGRAM(S): 100 INJECTION SUBCUTANEOUS at 09:42

## 2019-06-20 RX ADMIN — Medication 81 MILLIGRAM(S): at 11:19

## 2019-06-20 RX ADMIN — AZITHROMYCIN 250 MILLIGRAM(S): 500 TABLET, FILM COATED ORAL at 09:42

## 2019-06-20 RX ADMIN — TAMSULOSIN HYDROCHLORIDE 0.4 MILLIGRAM(S): 0.4 CAPSULE ORAL at 06:33

## 2019-06-20 RX ADMIN — BUDESONIDE AND FORMOTEROL FUMARATE DIHYDRATE 2 PUFF(S): 160; 4.5 AEROSOL RESPIRATORY (INHALATION) at 20:40

## 2019-06-20 RX ADMIN — POLYETHYLENE GLYCOL 3350 17 GRAM(S): 17 POWDER, FOR SOLUTION ORAL at 11:19

## 2019-06-20 RX ADMIN — LEVALBUTEROL 0.63 MILLIGRAM(S): 1.25 SOLUTION, CONCENTRATE RESPIRATORY (INHALATION) at 15:18

## 2019-06-20 RX ADMIN — Medication 112 MICROGRAM(S): at 06:33

## 2019-06-20 RX ADMIN — Medication 40 MILLIGRAM(S): at 06:34

## 2019-06-20 RX ADMIN — Medication 40 MILLIGRAM(S): at 09:43

## 2019-06-20 RX ADMIN — Medication 100 MILLIGRAM(S): at 06:33

## 2019-06-20 RX ADMIN — MIRTAZAPINE 15 MILLIGRAM(S): 45 TABLET, ORALLY DISINTEGRATING ORAL at 21:49

## 2019-06-20 RX ADMIN — TIOTROPIUM BROMIDE 1 CAPSULE(S): 18 CAPSULE ORAL; RESPIRATORY (INHALATION) at 09:42

## 2019-06-20 RX ADMIN — Medication 10 MILLIGRAM(S): at 11:18

## 2019-06-20 RX ADMIN — LEVALBUTEROL 0.63 MILLIGRAM(S): 1.25 SOLUTION, CONCENTRATE RESPIRATORY (INHALATION) at 03:24

## 2019-06-20 RX ADMIN — Medication 2: at 17:27

## 2019-06-20 RX ADMIN — ATORVASTATIN CALCIUM 40 MILLIGRAM(S): 80 TABLET, FILM COATED ORAL at 21:49

## 2019-06-20 RX ADMIN — Medication 100 MILLIGRAM(S): at 21:49

## 2019-06-20 RX ADMIN — Medication 1 DROP(S): at 20:40

## 2019-06-20 RX ADMIN — Medication 1 DROP(S): at 02:48

## 2019-06-20 RX ADMIN — BUDESONIDE AND FORMOTEROL FUMARATE DIHYDRATE 2 PUFF(S): 160; 4.5 AEROSOL RESPIRATORY (INHALATION) at 07:52

## 2019-06-20 NOTE — PROGRESS NOTE ADULT - SUBJECTIVE AND OBJECTIVE BOX
Patient is a 85y old  Male who presents with a chief complaint of cough (19 Jun 2019 16:10)      SUBJECTIVE / OVERNIGHT EVENTS: [place]  Sergio Palacios ID #[id number]. Patient states he feels "better". No sob or cp. He states he has cp with climbing stairs and walking uphill at baseline. No n/v/d/f/c.    MEDICATIONS  (STANDING):  amitriptyline 25 milliGRAM(s) Oral at bedtime  aspirin enteric coated 81 milliGRAM(s) Oral daily  atorvastatin 40 milliGRAM(s) Oral at bedtime  azithromycin  IVPB 500 milliGRAM(s) IV Intermittent every 24 hours  buDESOnide 160 MICROgram(s)/formoterol 4.5 MICROgram(s) Inhaler 2 Puff(s) Inhalation two times a day  dextrose 5%. 1000 milliLiter(s) (50 mL/Hr) IV Continuous <Continuous>  dextrose 50% Injectable 12.5 Gram(s) IV Push once  dextrose 50% Injectable 25 Gram(s) IV Push once  dextrose 50% Injectable 25 Gram(s) IV Push once  docusate sodium 100 milliGRAM(s) Oral three times a day  enoxaparin Injectable 40 milliGRAM(s) SubCutaneous every 24 hours  insulin lispro (HumaLOG) corrective regimen sliding scale   SubCutaneous three times a day before meals  insulin lispro (HumaLOG) corrective regimen sliding scale   SubCutaneous at bedtime  isosorbide   mononitrate ER Tablet (IMDUR) 60 milliGRAM(s) Oral daily  levalbuterol Inhalation 0.63 milliGRAM(s) Inhalation every 6 hours  levothyroxine 112 MICROGram(s) Oral daily  metoprolol succinate ER 50 milliGRAM(s) Oral daily  mirtazapine 15 milliGRAM(s) Oral at bedtime  polyethylene glycol 3350 17 Gram(s) Oral daily  prednisoLONE acetate 1% Suspension 1 Drop(s) Right EYE four times a day  predniSONE   Tablet 40 milliGRAM(s) Oral daily  senna 2 Tablet(s) Oral at bedtime  tamsulosin 0.4 milliGRAM(s) Oral two times a day  tiotropium 18 MICROgram(s) Capsule 1 Capsule(s) Inhalation daily    MEDICATIONS  (PRN):  acetaminophen   Tablet .. 650 milliGRAM(s) Oral every 6 hours PRN Mild Pain (1 - 3), Moderate Pain (4 - 6)  benzonatate 100 milliGRAM(s) Oral every 8 hours PRN Cough  dextrose 40% Gel 15 Gram(s) Oral once PRN Blood Glucose LESS THAN 70 milliGRAM(s)/deciliter  glucagon  Injectable 1 milliGRAM(s) IntraMuscular once PRN Glucose LESS THAN 70 milligrams/deciliter      PHYSICAL EXAM:  T(C): 36.6 (06-20-19 @ 16:37), Max: 36.7 (06-20-19 @ 14:42)  HR: 82 (06-20-19 @ 16:37) (70 - 82)  BP: 100/54 (06-20-19 @ 16:37) (100/54 - 103/50)  RR: 17 (06-20-19 @ 16:37) (17 - 20)  SpO2: 94% (06-20-19 @ 16:37) (94% - 99%)  I&O's Summary    19 Jun 2019 07:01  -  20 Jun 2019 07:00  --------------------------------------------------------  IN: 500 mL / OUT: 300 mL / NET: 200 mL    20 Jun 2019 07:01  -  20 Jun 2019 16:49  --------------------------------------------------------  IN: 950 mL / OUT: 900 mL / NET: 50 mL      GENERAL: NAD, well-developed, lying in bed, pleasant  HEAD:  Atraumatic, Normocephalic, MMM  CHEST/LUNG: Clear to auscultation bilaterally; No wheeze  HEART: Regular rate and rhythm; No murmurs, rubs, or gallops  ABDOMEN: Soft, Nontender, Nondistended; Bowel sounds present  EXTREMITIES:  2+ Peripheral Pulses, No clubbing, cyanosis, or edema  PSYCH: AAOx3  NEUROLOGY: CN II-XII grossly intact, moving all extremities    LABS:  CAPILLARY BLOOD GLUCOSE      POCT Blood Glucose.: 147 mg/dL (20 Jun 2019 12:36)  POCT Blood Glucose.: 131 mg/dL (20 Jun 2019 08:33)  POCT Blood Glucose.: 146 mg/dL (19 Jun 2019 21:24)  POCT Blood Glucose.: 116 mg/dL (19 Jun 2019 17:23)                          11.2   13.57 )-----------( 191      ( 20 Jun 2019 07:04 )             34.9     06-20    139  |  108<H>  |  26<H>  ----------------------------<  147<H>  4.4   |  20<L>  |  0.96    Ca    9.1      20 Jun 2019 07:04  Mg     2.0     06-20                RADIOLOGY & ADDITIONAL TESTS:    Telemetry Personally Reviewed -     Imaging Personally Reviewed -     Imaging Reviewed - TTE - no wall motion abnormalities; normal LVEF    Consultant(s) Notes Reviewed -       Care Discussed with Consultants/Other Providers -

## 2019-06-20 NOTE — CHART NOTE - NSCHARTNOTEFT_GEN_A_CORE
Patient has a pulmonary follow up appointment scheduled.    Pulmonary: Dr. Garcia  Date: 6/28/2019, 2PM  Location: [address] [place] [address], [address]. [place] [address]  Tel: [phone]

## 2019-06-20 NOTE — PROGRESS NOTE ADULT - ASSESSMENT
Patient is an 85 year old man with HTN, DM2 not on insulin, Colon Ca s/p partial colectomy (3/2019)  Melanoma s/p keytruda, PE s/p Xarelto (reported to have stopped 3 months ago)  hyperlipidemia, COPD not on home O2, CAD s/p Stent and CABG presents with cough and inability to catch breath. Patient states that for 3 days he has had a non-productive cough that occurs often that makes it very difficult to catch his breath. Cough has been occurring more frequently and is associated with chest discomfort. He notes that one night ago slept with window open and thinks he got a cold because he has some pain when swallowing in his throat.  He has been constipated for 3 days and when he bears down to have a BM causes nosebleed.  +intermittent palpitations. When EMS was called he received 3 nebulizers and oxygen and felt much better.     Upon initial presentation to ED O2 on room air was 77% which quickly improved to 98% with 2L of O2.  In the ED, 12-lead ECG noted atrial fibrillation with a rapid ventricular rate ~130 bpm.  He spontaneously converted back to sinus rhythm by the time I saw him.  He no longer complains of having palpitations, and currently denies having chest discomfort.  His main complaint is dyspnea, but reports that he feels better now.    He also mentioned that he follows with Dr. Cuevas, who is his cardiologist on [place], and had recently undergone a cardiac workup including an echocardiogram that was unremarkable as per patient.    On physical examination, he was noted to be in SR on telemetry.  He was in NAD, laying flat in bed, exhibited a Grade 2-3/6 SM, and had coarse breath sounds bilaterally on lung exam.  No LE edema noted.    His last cardiac assessment here included a cardiac catheterization in 2017 which demonstrated patent grafts and an echocardiogram that demonstrated normal LVEF.    At this time, will recommend obtaining an echocardiogram and continuing to monitor on telemetry.  His CHADSVASC score is 6.  He may very need to be started on anticoagulation pending further evaluation and consultations with his GI especially with recent colectomy for colon cancer, and findings on telemetry monitoring,  Of note, he was previously on anticoagulation for PE.    Will arrange for ischemic evaluation pending echocardiogram and symptoms.

## 2019-06-21 LAB
ALDOST SERPL-MCNC: 8.4 NG/DL — SIGNIFICANT CHANGE UP
ANION GAP SERPL CALC-SCNC: 11 MMO/L — SIGNIFICANT CHANGE UP (ref 7–14)
BUN SERPL-MCNC: 31 MG/DL — HIGH (ref 7–23)
CALCIUM SERPL-MCNC: 9.2 MG/DL — SIGNIFICANT CHANGE UP (ref 8.4–10.5)
CHLORIDE SERPL-SCNC: 109 MMOL/L — HIGH (ref 98–107)
CO2 SERPL-SCNC: 21 MMOL/L — LOW (ref 22–31)
CREAT SERPL-MCNC: 0.98 MG/DL — SIGNIFICANT CHANGE UP (ref 0.5–1.3)
GLUCOSE SERPL-MCNC: 111 MG/DL — HIGH (ref 70–99)
HCT VFR BLD CALC: 33.6 % — LOW (ref 39–50)
HGB BLD-MCNC: 10.9 G/DL — LOW (ref 13–17)
MAGNESIUM SERPL-MCNC: 1.9 MG/DL — SIGNIFICANT CHANGE UP (ref 1.6–2.6)
MCHC RBC-ENTMCNC: 29.1 PG — SIGNIFICANT CHANGE UP (ref 27–34)
MCHC RBC-ENTMCNC: 32.4 % — SIGNIFICANT CHANGE UP (ref 32–36)
MCV RBC AUTO: 89.6 FL — SIGNIFICANT CHANGE UP (ref 80–100)
NRBC # FLD: 0 K/UL — SIGNIFICANT CHANGE UP (ref 0–0)
PHOSPHATE SERPL-MCNC: 3.4 MG/DL — SIGNIFICANT CHANGE UP (ref 2.5–4.5)
PLATELET # BLD AUTO: 184 K/UL — SIGNIFICANT CHANGE UP (ref 150–400)
PMV BLD: 10.3 FL — SIGNIFICANT CHANGE UP (ref 7–13)
POTASSIUM SERPL-MCNC: 3.9 MMOL/L — SIGNIFICANT CHANGE UP (ref 3.5–5.3)
POTASSIUM SERPL-SCNC: 3.9 MMOL/L — SIGNIFICANT CHANGE UP (ref 3.5–5.3)
RBC # BLD: 3.75 M/UL — LOW (ref 4.2–5.8)
RBC # FLD: 14.9 % — HIGH (ref 10.3–14.5)
SODIUM SERPL-SCNC: 141 MMOL/L — SIGNIFICANT CHANGE UP (ref 135–145)
WBC # BLD: 12.43 K/UL — HIGH (ref 3.8–10.5)
WBC # FLD AUTO: 12.43 K/UL — HIGH (ref 3.8–10.5)

## 2019-06-21 PROCEDURE — 99233 SBSQ HOSP IP/OBS HIGH 50: CPT

## 2019-06-21 PROCEDURE — 93018 CV STRESS TEST I&R ONLY: CPT | Mod: GC

## 2019-06-21 PROCEDURE — 78452 HT MUSCLE IMAGE SPECT MULT: CPT | Mod: 26

## 2019-06-21 PROCEDURE — 99232 SBSQ HOSP IP/OBS MODERATE 35: CPT

## 2019-06-21 PROCEDURE — 93016 CV STRESS TEST SUPVJ ONLY: CPT | Mod: GC

## 2019-06-21 RX ORDER — METOPROLOL TARTRATE 50 MG
50 TABLET ORAL DAILY
Refills: 0 | Status: DISCONTINUED | OUTPATIENT
Start: 2019-06-21 | End: 2019-06-24

## 2019-06-21 RX ADMIN — TAMSULOSIN HYDROCHLORIDE 0.4 MILLIGRAM(S): 0.4 CAPSULE ORAL at 05:52

## 2019-06-21 RX ADMIN — ENOXAPARIN SODIUM 40 MILLIGRAM(S): 100 INJECTION SUBCUTANEOUS at 10:16

## 2019-06-21 RX ADMIN — AZITHROMYCIN 500 MILLIGRAM(S): 500 TABLET, FILM COATED ORAL at 11:22

## 2019-06-21 RX ADMIN — ATORVASTATIN CALCIUM 40 MILLIGRAM(S): 80 TABLET, FILM COATED ORAL at 21:42

## 2019-06-21 RX ADMIN — BUDESONIDE AND FORMOTEROL FUMARATE DIHYDRATE 2 PUFF(S): 160; 4.5 AEROSOL RESPIRATORY (INHALATION) at 10:16

## 2019-06-21 RX ADMIN — Medication 100 MILLIGRAM(S): at 21:42

## 2019-06-21 RX ADMIN — SENNA PLUS 2 TABLET(S): 8.6 TABLET ORAL at 21:42

## 2019-06-21 RX ADMIN — Medication 1 DROP(S): at 02:38

## 2019-06-21 RX ADMIN — Medication 112 MICROGRAM(S): at 05:52

## 2019-06-21 RX ADMIN — TAMSULOSIN HYDROCHLORIDE 0.4 MILLIGRAM(S): 0.4 CAPSULE ORAL at 18:23

## 2019-06-21 RX ADMIN — Medication 100 MILLIGRAM(S): at 14:32

## 2019-06-21 RX ADMIN — Medication 40 MILLIGRAM(S): at 05:52

## 2019-06-21 RX ADMIN — BUDESONIDE AND FORMOTEROL FUMARATE DIHYDRATE 2 PUFF(S): 160; 4.5 AEROSOL RESPIRATORY (INHALATION) at 21:43

## 2019-06-21 RX ADMIN — Medication 81 MILLIGRAM(S): at 14:29

## 2019-06-21 RX ADMIN — Medication 1 DROP(S): at 14:33

## 2019-06-21 RX ADMIN — TIOTROPIUM BROMIDE 1 CAPSULE(S): 18 CAPSULE ORAL; RESPIRATORY (INHALATION) at 10:16

## 2019-06-21 RX ADMIN — Medication 100 MILLIGRAM(S): at 05:52

## 2019-06-21 RX ADMIN — Medication 50 MILLIGRAM(S): at 10:52

## 2019-06-21 RX ADMIN — Medication 4: at 14:31

## 2019-06-21 RX ADMIN — Medication 25 MILLIGRAM(S): at 21:42

## 2019-06-21 RX ADMIN — MIRTAZAPINE 15 MILLIGRAM(S): 45 TABLET, ORALLY DISINTEGRATING ORAL at 21:42

## 2019-06-21 RX ADMIN — ISOSORBIDE MONONITRATE 60 MILLIGRAM(S): 60 TABLET, EXTENDED RELEASE ORAL at 14:30

## 2019-06-21 RX ADMIN — Medication 1 DROP(S): at 19:39

## 2019-06-21 RX ADMIN — LEVALBUTEROL 0.63 MILLIGRAM(S): 1.25 SOLUTION, CONCENTRATE RESPIRATORY (INHALATION) at 03:21

## 2019-06-21 NOTE — DIETITIAN INITIAL EVALUATION ADULT. - OTHER INFO
Nutrition consult ordered for RD visit, 85 y.o male admitted with the DX of SOB, HTN, DM2,colon CA, S/P partial  colectomy, 3/2019, observed to have good po and appetite, prefers food from home. No nausea, vomiting , diarrhea or significant   weight changes reported at this time. Labs reviewed, current diet remains appropriate. Diet compliance encouraged. RD remains available.

## 2019-06-21 NOTE — PROGRESS NOTE ADULT - SUBJECTIVE AND OBJECTIVE BOX
Cardiology/Vascular Medicine Inpatient Progress Note    No new complaints  No new interval events noted on telemetry    Vital Signs Last 24 Hrs  T(C): 36.7 (2019 05:45), Max: 36.7 (2019 14:42)  T(F): 98.1 (2019 05:45), Max: 98.1 (2019 14:42)  HR: 66 (2019 05:45) (66 - 85)  BP: 97/49 (2019 05:45) (97/49 - 103/50)  BP(mean): --  RR: 18 (2019 05:45) (17 - 19)  SpO2: 96% (2019 05:45) (93% - 100%)    Appearance: NAD, laying flat in bed  HEENT:   Normal oral mucosa, PERRL, EOMI	  Cardiovascular: Normal S1 S2, No JVD, 2-3/6 SM  Respiratory: Coarse breath sounds bilaterally  Psychiatry: Awake, alert  Gastrointestinal:  Soft, Non-tender, + BS	  Skin: No rashes, No ecchymoses, No cyanosis	  Neurologic: Non-focal  Extremities: Normal range of motion, No clubbing, cyanosis or edema  Vascular: Peripheral pulses palpable 2+ bilaterally    MEDICATIONS  (STANDING):  amitriptyline 25 milliGRAM(s) Oral at bedtime  aspirin enteric coated 81 milliGRAM(s) Oral daily  atorvastatin 40 milliGRAM(s) Oral at bedtime  azithromycin   Tablet 500 milliGRAM(s) Oral once  buDESOnide 160 MICROgram(s)/formoterol 4.5 MICROgram(s) Inhaler 2 Puff(s) Inhalation two times a day  dextrose 5%. 1000 milliLiter(s) (50 mL/Hr) IV Continuous <Continuous>  dextrose 50% Injectable 12.5 Gram(s) IV Push once  dextrose 50% Injectable 25 Gram(s) IV Push once  dextrose 50% Injectable 25 Gram(s) IV Push once  docusate sodium 100 milliGRAM(s) Oral three times a day  enoxaparin Injectable 40 milliGRAM(s) SubCutaneous every 24 hours  insulin lispro (HumaLOG) corrective regimen sliding scale   SubCutaneous three times a day before meals  insulin lispro (HumaLOG) corrective regimen sliding scale   SubCutaneous at bedtime  isosorbide   mononitrate ER Tablet (IMDUR) 60 milliGRAM(s) Oral daily  levalbuterol Inhalation 0.63 milliGRAM(s) Inhalation every 6 hours  levothyroxine 112 MICROGram(s) Oral daily  metoprolol succinate ER 50 milliGRAM(s) Oral daily  mirtazapine 15 milliGRAM(s) Oral at bedtime  polyethylene glycol 3350 17 Gram(s) Oral daily  prednisoLONE acetate 1% Suspension 1 Drop(s) Right EYE four times a day  predniSONE   Tablet 40 milliGRAM(s) Oral daily  senna 2 Tablet(s) Oral at bedtime  tamsulosin 0.4 milliGRAM(s) Oral two times a day  tiotropium 18 MICROgram(s) Capsule 1 Capsule(s) Inhalation daily    MEDICATIONS  (PRN):  acetaminophen   Tablet .. 650 milliGRAM(s) Oral every 6 hours PRN Mild Pain (1 - 3), Moderate Pain (4 - 6)  benzonatate 100 milliGRAM(s) Oral every 8 hours PRN Cough  dextrose 40% Gel 15 Gram(s) Oral once PRN Blood Glucose LESS THAN 70 milliGRAM(s)/deciliter  glucagon  Injectable 1 milliGRAM(s) IntraMuscular once PRN Glucose LESS THAN 70 milligrams/deciliter      LABS:	 	                          10.9   12.43 )-----------( 184      ( 2019 06:20 )             33.6   06-20    139  |  108<H>  |  26<H>  ----------------------------<  147<H>  4.4   |  20<L>  |  0.96    Ca    9.1      2019 07:04  Mg     2.0     06-20        < from: Cardiac Cath Lab - Adult (17 @ 14:14) >  [facility]  Department of Cardiology  [address] [place] [address]  [phone]  Cath Lab Report -- Comprehensive Report  Patient: ÁNGEL GARZA  Study date: 2017  Account number: [id number]  MR number:[id number]  : 05/10/1934  Gender: Male  Race: O  Case Physician(s):  Shanti Bowie M.D.  Referring Physician:  INDICATIONS: Unstable angina - CCS4.  HISTORY: The patient has a history of coronary artery disease. The patient  has hypertension, medication-treated dyslipidemia, low HDL, and a family  history of coronary artery disease.  PROCEDURE:  --  Left heart catheterization with ventriculography.  --  Left coronary angiography.  --  Right coronary angiography.  --  Bypass graft angiography.  TECHNIQUE: The risks and alternatives of the procedures and conscious  sedation were explained to the patient and informed consent was obtained.  Cardiac catheterization performed electively.  Local anesthetic given. Right femoral artery access. Localanesthetic  given. A 5FR SHEATH PINNACLE was inserted in the vessel, utilizing the  modified Seldinger technique. Left heart catheterization. Ventriculography  was performed. A 5FR FR4.0 EXPO catheter was utilized. After recording  ascending aortic pressure, the catheter was advanced across the aortic  valve and left ventricular pressure was recorded. Imaging was performed  using an CAVAZOS projection. Post-ventriculography LV pressure was obtained.  The catheter was gradually withdrawn into the aorta under continuous  pressure monitoring and aortic pressure was recorded. Left coronary artery  angiography. The vessel was injected utilizing a 5FR FL4.0 EXPO catheter  and positioned in the vessel ostia under fluoroscopic guidance.  Angiography was performed in multiple projections using hand-injection of  contrast. Right coronary artery angiography. The vessel was injected  utilizing a 5FR FR4.0 EXPO catheter and positioned in the vessel ostia  under fluoroscopic guidance. Angiography was performed in multiple  projections using hand-injection of contrast. Graft angiography. The  vessel was injected utilizing a 5FR FR4.0 EXPO catheter and positioned in  the vessel ostia under fluoroscopic guidance. Angiography was performed in  multiple projections using hand-injection of contrast. RADIATION EXPOSURE:  5.5 min.  CONTRAST GIVEN: Omnipaque 28 ml.  VENTRICLES: EF estimated was 55 %.  CORONARY VESSELS: The coronary circulation is left dominant.  LM:   --  LM: Normal.  LAD:   --  Proximal LAD: There was a 100 % stenosis.  --  D1: There was a tubular 25 % stenosis at the site of a prior  angioplasty with stent placement.  CX:   --  LPDA: There was a 100 % stenosis.  RCA:   --  Mid RCA: There was a 100 % stenosis.  GRAFTS:   --  Graft to the mid LAD: The graft was a normal sized LIMA.  Distal vessel angiography showed mild diffuse disease.  --  Graft to the LPDA: The graft was a normal sized saphenous vein graft  from the aorta. Graft angiography showed mild atherosclerosis. Distal  vessel angiography showed mild diffuse disease.  COMPLICATIONS: There were no complications.  DIAGNOSTIC RECOMMENDATIONS: Medical management is recommended.  Prepared and signed by  Shanti Bowie M.D.  Signed 2017 18:16:49  HEMODYNAMIC TABLES  Pressures:  Baseline/ Room Air  Pressures:  - HR: 69  Pressures:  - Rhythm:  Pressures:  -- Aortic Pressure (S/D/M): 113/64/84  Pressures:  -- Left Ventricle (s/edp): 118/12/--  Outputs:  Baseline/ Room Air  Outputs:  -- CALCULATIONS[age]  Outputs:  -- CALCULATIONS: Body Surface Area: 1.78  Outputs:  -- CALCULATIONS: Height in cm: 167.00  Outputs:  -- CALCULATIONS: Sex: Male  Outputs:  -- CALCULATIONS: Weight in k.70    < end of copied text >      < from: CT Angio Chest w/ IV Cont (19 @ 21:39) >  EXAM:  CT ANGIO CHEST (W)AW IC        PROCEDURE DATE:  2019         INTERPRETATION:  CLINICAL INFORMATION: Dyspnea. Evaluate for pulmonary   embolus    COMPARISON: None.    PROCEDURE:   CT Angiography of the Chest.  90 ml of Omnipaque 350 was injected intravenously. 10 ml were discarded.  Sagittal and coronal reformats were performed as well as 3D (MIP)   reconstructions.      FINDINGS:    LUNGS AND AIRWAYS: Patent central airways.  Punctate left apical nodular   opacities likely distal mucus impaction. Bilateral parenchymal scarring.    PLEURA: No pleural effusion.    MEDIASTINUM AND CECY: No lymphadenopathy.    VESSELS: Poor bolus opacification and respiratory motion limits   evaluation for pulmonary embolus. No central or lobar pulmonary embolus.   Evaluation of the segmental and subsegmental pulmonary arteries is   nondiagnostic. Normal left-sided 3 vessel aortic arch.    HEART: Heart size is normal. No pericardial effusion.    CHEST WALL AND LOWER NECK: Status post midline sternotomy    VISUALIZED UPPER ABDOMEN: 2 cm heterogeneous left adrenal nodule. The   gallbladder is surgically absent. 1.7 cm indeterminate left renal lesion.    BONES: There is multilevel thoracic spondylosis.    IMPRESSION:     No central or lobar acute pulmonary arterial embolus. Nondiagnostic study   for acute pulmonary arterial embolus of the segmental and subsegmental   pulmonary arteries.    Indeterminate 2 cm left adrenal lesion.    1.7 cm indeterminate left renal lesion.      CR PENN M.D., RADIOLOGY RESIDENT  This document has been electronically signed.  AMERICA BULL M.D., ATTENDING RADIOLOGIST  This document has been electronically signed. 2019 12:10AM      < from: Transthoracic Echocardiogram (19 @ 09:52) >    Patient name: Ángel Garza  YOB: 1934   Age: 85 (M)   MR#: [id number]  Study Date: 2019  Location: 45 Sonographer: Lauren Finkelstein, [facility]  Study quality: Technically good  Referring Physician: Edwar Wilkins MD  Blood Pressure: 102/61 mmHg  Height: 167 cm  Weight: 67 kg  BSA: 1.8 m2  ------------------------------------------------------------------------  PROCEDURE: Transthoracic echocardiogram with 2-D, M-Mode  and complete spectral and color flow Doppler.  INDICATION: Abnormal electrocardiogram (ECG) (EKG)  (R94.31), Shortness of breath (R06.02)  ------------------------------------------------------------------------  DIMENSIONS:  Dimensions:     Normal Values:  LA:     4.3 cm    2.0 - 4.0 cm  Ao:     3.0 cm    2.0- 3.8 cm  SEPTUM: 0.8 cm    0.6 - 1.2 cm  PWT:    0.8 cm    0.6 - 1.1 cm  LVIDd:  3.8 cm    3.0 - 5.6 cm  LVIDs:  2.5 cm    1.8 - 4.0 cm  Derived Variables:  LVMI: 49 g/m2  RWT: 0.42  Fractional short: 34 %  Ejection Fraction (Carlosicholtz): 64 %  ------------------------------------------------------------------------  OBSERVATIONS:  Mitral Valve: Mitral annular calcification, otherwise  normal mitral valve. Mild mitral regurgitation.  Aortic Root: Normal aortic root.  Aortic Valve: Calcified trileaflet aortic valve with normal  opening. Mild-moderate aortic regurgitation.  Left Atrium: Normal left atrium.  LA volume index = 24  cc/m2.  Left Ventricle: Normal left ventricular systolic function.  No segmental wall motion abnormalities. Normal left  ventricular internal dimensions and wall thicknesses.  Right Heart: Normal right atrium. The right ventricle is  not well visualized; grossly normal right ventricular  systolic function. Normal tricuspid valve.  Mild tricuspid  regurgitation. Normal pulmonic valve.  Pericardium/PleuraNormal pericardium with no pericardial  effusion.  ------------------------------------------------------------------------  CONCLUSIONS:  1. Mitral annular calcification, otherwise normal mitral  valve. Mild mitral regurgitation.  2. Calcified trileaflet aortic valve with normal opening.  Mild-moderate aortic regurgitation.  3. Normal left ventricular internal dimensions and wall  thicknesses.  4. Normal left ventricular systolic function. No segmental  wall motion abnormalities.  5. The right ventricle is not well visualized; grossly  normal right ventricular systolic function.  6. Normal tricuspid valve.  Mild tricuspid regurgitation.  ------------------------------------------------------------------------  Confirmed on  2019 - 14:00:20 by CORINA Leiva  ------------------------------------------------------------------------    < end of copied text > Cardiology/Vascular Medicine Inpatient Progress Note    No new complaints  Telemetry: AFib noted on telemetry this 3Am with ventricular rates to 130s bpm    With CHADSVASC 6, recommend anticoagulation if patient able to tolerate  Will need to receive clearance for anticoagulation from GI/Gi surgery  Can consider heparin gtt challenge while in hospital    Vital Signs Last 24 Hrs  T(C): 36.7 (2019 05:45), Max: 36.7 (2019 14:42)  T(F): 98.1 (2019 05:45), Max: 98.1 (2019 14:42)  HR: 66 (2019 05:45) (66 - 85)  BP: 97/49 (2019 05:45) (97/49 - 103/50)  BP(mean): --  RR: 18 (2019 05:45) (17 - 19)  SpO2: 96% (2019 05:45) (93% - 100%)    Appearance: NAD, laying flat in bed  HEENT:   Normal oral mucosa, PERRL, EOMI	  Cardiovascular: Normal S1 S2, No JVD, 2-3/6 SM  Respiratory: Coarse breath sounds bilaterally  Psychiatry: Awake, alert  Gastrointestinal:  Soft, Non-tender, + BS	  Skin: No rashes, No ecchymoses, No cyanosis	  Neurologic: Non-focal  Extremities: Normal range of motion, No clubbing, cyanosis or edema  Vascular: Peripheral pulses palpable 2+ bilaterally    MEDICATIONS  (STANDING):  amitriptyline 25 milliGRAM(s) Oral at bedtime  aspirin enteric coated 81 milliGRAM(s) Oral daily  atorvastatin 40 milliGRAM(s) Oral at bedtime  azithromycin   Tablet 500 milliGRAM(s) Oral once  buDESOnide 160 MICROgram(s)/formoterol 4.5 MICROgram(s) Inhaler 2 Puff(s) Inhalation two times a day  dextrose 5%. 1000 milliLiter(s) (50 mL/Hr) IV Continuous <Continuous>  dextrose 50% Injectable 12.5 Gram(s) IV Push once  dextrose 50% Injectable 25 Gram(s) IV Push once  dextrose 50% Injectable 25 Gram(s) IV Push once  docusate sodium 100 milliGRAM(s) Oral three times a day  enoxaparin Injectable 40 milliGRAM(s) SubCutaneous every 24 hours  insulin lispro (HumaLOG) corrective regimen sliding scale   SubCutaneous three times a day before meals  insulin lispro (HumaLOG) corrective regimen sliding scale   SubCutaneous at bedtime  isosorbide   mononitrate ER Tablet (IMDUR) 60 milliGRAM(s) Oral daily  levalbuterol Inhalation 0.63 milliGRAM(s) Inhalation every 6 hours  levothyroxine 112 MICROGram(s) Oral daily  metoprolol succinate ER 50 milliGRAM(s) Oral daily  mirtazapine 15 milliGRAM(s) Oral at bedtime  polyethylene glycol 3350 17 Gram(s) Oral daily  prednisoLONE acetate 1% Suspension 1 Drop(s) Right EYE four times a day  predniSONE   Tablet 40 milliGRAM(s) Oral daily  senna 2 Tablet(s) Oral at bedtime  tamsulosin 0.4 milliGRAM(s) Oral two times a day  tiotropium 18 MICROgram(s) Capsule 1 Capsule(s) Inhalation daily    MEDICATIONS  (PRN):  acetaminophen   Tablet .. 650 milliGRAM(s) Oral every 6 hours PRN Mild Pain (1 - 3), Moderate Pain (4 - 6)  benzonatate 100 milliGRAM(s) Oral every 8 hours PRN Cough  dextrose 40% Gel 15 Gram(s) Oral once PRN Blood Glucose LESS THAN 70 milliGRAM(s)/deciliter  glucagon  Injectable 1 milliGRAM(s) IntraMuscular once PRN Glucose LESS THAN 70 milligrams/deciliter      LABS:	 	                          10.9   12.43 )-----------( 184      ( 2019 06:20 )             33.6   06-20    139  |  108<H>  |  26<H>  ----------------------------<  147<H>  4.4   |  20<L>  |  0.96    Ca    9.1      2019 07:04  Mg     2.0     06-20        < from: Cardiac Cath Lab - Adult (17 @ 14:14) >  [facility]  Department of Cardiology  [address] [place] [address] [phone]  Cath Lab Report -- Comprehensive Report  Patient: ÁNGEL GARZA  Study date: 2017  Account number: [id number]  MR number:[id number]  : 05/10/1934  Gender: Male  Race: O  Case Physician(s):  Shanti Bowie M.D.  Referring Physician:  INDICATIONS: Unstable angina - CCS4.  HISTORY: The patient has a history of coronary artery disease. The patient  has hypertension, medication-treated dyslipidemia, low HDL, and a family  history of coronary artery disease.  PROCEDURE:  --  Left heart catheterization with ventriculography.  --  Left coronary angiography.  --  Right coronary angiography.  --  Bypass graft angiography.  TECHNIQUE: The risks and alternatives of the procedures and conscious  sedation were explained to the patient and informed consent was obtained.  Cardiac catheterization performed electively.  Local anesthetic given. Right femoral artery access. Localanesthetic  given. A 5FR SHEATH PINNACLE was inserted in the vessel, utilizing the  modified Seldinger technique. Left heart catheterization. Ventriculography  was performed. A 5FR FR4.0 EXPO catheter was utilized. After recording  ascending aortic pressure, the catheter was advanced across the aortic  valve and left ventricular pressure was recorded. Imaging was performed  using an CAVAZOS projection. Post-ventriculography LV pressure was obtained.  The catheter was gradually withdrawn into the aorta under continuous  pressure monitoring and aortic pressure was recorded. Left coronary artery  angiography. The vessel was injected utilizing a 5FR FL4.0 EXPO catheter  and positioned in the vessel ostia under fluoroscopic guidance.  Angiography was performed in multiple projections using hand-injection of  contrast. Right coronary artery angiography. The vessel was injected  utilizing a 5FR FR4.0 EXPO catheter and positioned in the vessel ostia  under fluoroscopic guidance. Angiography was performed in multiple  projections using hand-injection of contrast. Graft angiography. The  vessel was injected utilizing a 5FR FR4.0 EXPO catheter and positioned in  the vessel ostia under fluoroscopic guidance. Angiography was performed in  multiple projections using hand-injection of contrast. RADIATION EXPOSURE:  5.5 min.  CONTRAST GIVEN: Omnipaque 28 ml.  VENTRICLES: EF estimated was 55 %.  CORONARY VESSELS: The coronary circulation is left dominant.  LM:   --  LM: Normal.  LAD:   --  Proximal LAD: There was a 100 % stenosis.  --  D1: There was a tubular 25 % stenosis at the site of a prior  angioplasty with stent placement.  CX:   --  LPDA: There was a 100 % stenosis.  RCA:   --  Mid RCA: There was a 100 % stenosis.  GRAFTS:   --  Graft to the mid LAD: The graft was a normal sized LIMA.  Distal vessel angiography showed mild diffuse disease.  --  Graft to the LPDA: The graft was a normal sized saphenous vein graft  from the aorta. Graft angiography showed mild atherosclerosis. Distal  vessel angiography showed mild diffuse disease.  COMPLICATIONS: There were no complications.  DIAGNOSTIC RECOMMENDATIONS: Medical management is recommended.  Prepared and signed by  Shanti Bowie M.D.  Signed 2017 18:16:49  HEMODYNAMIC TABLES  Pressures:  Baseline/ Room Air  Pressures:  - HR: 69  Pressures:  - Rhythm:  Pressures:  -- Aortic Pressure (S/D/M): 113/64/84  Pressures:  -- Left Ventricle (s/edp): 118/12/--  Outputs:  Baseline/ Room Air  Outputs:  -- CALCULATIONS[age]  Outputs:  -- CALCULATIONS: Body Surface Area: 1.78  Outputs:  -- CALCULATIONS: Height in cm: 167.00  Outputs:  -- CALCULATIONS: Sex: Male  Outputs:  -- CALCULATIONS: Weight in k.70    < end of copied text >      < from: CT Angio Chest w/ IV Cont (19 @ 21:39) >  EXAM:  CT ANGIO CHEST (W)AW IC        PROCEDURE DATE:  2019         INTERPRETATION:  CLINICAL INFORMATION: Dyspnea. Evaluate for pulmonary   embolus    COMPARISON: None.    PROCEDURE:   CT Angiography of the Chest.  90 ml of Omnipaque 350 was injected intravenously. 10 ml were discarded.  Sagittal and coronal reformats were performed as well as 3D (MIP)   reconstructions.      FINDINGS:    LUNGS AND AIRWAYS: Patent central airways.  Punctate left apical nodular   opacities likely distal mucus impaction. Bilateral parenchymal scarring.    PLEURA: No pleural effusion.    MEDIASTINUM AND CECY: No lymphadenopathy.    VESSELS: Poor bolus opacification and respiratory motion limits   evaluation for pulmonary embolus. No central or lobar pulmonary embolus.   Evaluation of the segmental and subsegmental pulmonary arteries is   nondiagnostic. Normal left-sided 3 vessel aortic arch.    HEART: Heart size is normal. No pericardial effusion.    CHEST WALL AND LOWER NECK: Status post midline sternotomy    VISUALIZED UPPER ABDOMEN: 2 cm heterogeneous left adrenal nodule. The   gallbladder is surgically absent. 1.7 cm indeterminate left renal lesion.    BONES: There is multilevel thoracic spondylosis.    IMPRESSION:     No central or lobar acute pulmonary arterial embolus. Nondiagnostic study   for acute pulmonary arterial embolus of the segmental and subsegmental   pulmonary arteries.    Indeterminate 2 cm left adrenal lesion.    1.7 cm indeterminate left renal lesion.      CR PENN M.D., RADIOLOGY RESIDENT  This document has been electronically signed.  AMERICA BULL M.D., ATTENDING RADIOLOGIST  This document has been electronically signed. 2019 12:10AM      < from: Transthoracic Echocardiogram (19 @ 09:52) >    Patient name: Ángel Garza  YOB: 1934   Age: 85 (M)   MR#: [id number]  Study Date: 2019  Location: [facility] Sonographer: Lauren Finkelstein, [facility]  Study quality: Technically good  Referring Physician: Edwar Wilkins MD  Blood Pressure: 102/61 mmHg  Height: 167 cm  Weight: 67 kg  BSA: 1.8 m2  ------------------------------------------------------------------------  PROCEDURE: Transthoracic echocardiogram with 2-D, M-Mode  and complete spectral and color flow Doppler.  INDICATION: Abnormal electrocardiogram (ECG) (EKG)  (R94.31), Shortness of breath (R06.02)  ------------------------------------------------------------------------  DIMENSIONS:  Dimensions:     Normal Values:  LA:     4.3 cm    2.0 - 4.0 cm  Ao:     3.0 cm    2.0- 3.8 cm  SEPTUM: 0.8 cm    0.6 - 1.2 cm  PWT:    0.8 cm    0.6 - 1.1 cm  LVIDd:  3.8 cm    3.0 - 5.6 cm  LVIDs:  2.5 cm    1.8 - 4.0 cm  Derived Variables:  LVMI: 49 g/m2  RWT: 0.42  Fractional short: 34 %  Ejection Fraction (Teicholtz): 64 %  ------------------------------------------------------------------------  OBSERVATIONS:  Mitral Valve: Mitral annular calcification, otherwise  normal mitral valve. Mild mitral regurgitation.  Aortic Root: Normal aortic root.  Aortic Valve: Calcified trileaflet aortic valve with normal  opening. Mild-moderate aortic regurgitation.  Left Atrium: Normal left atrium.  LA volume index = 24  cc/m2.  Left Ventricle: Normal left ventricular systolic function.  No segmental wall motion abnormalities. Normal left  ventricular internal dimensions and wall thicknesses.  Right Heart: Normal right atrium. The right ventricle is  not well visualized; grossly normal right ventricular  systolic function. Normal tricuspid valve.  Mild tricuspid  regurgitation. Normal pulmonic valve.  Pericardium/PleuraNormal pericardium with no pericardial  effusion.  ------------------------------------------------------------------------  CONCLUSIONS:  1. Mitral annular calcification, otherwise normal mitral  valve. Mild mitral regurgitation.  2. Calcified trileaflet aortic valve with normal opening.  Mild-moderate aortic regurgitation.  3. Normal left ventricular internal dimensions and wall  thicknesses.  4. Normal left ventricular systolic function. No segmental  wall motion abnormalities.  5. The right ventricle is not well visualized; grossly  normal right ventricular systolic function.  6. Normal tricuspid valve.  Mild tricuspid regurgitation.  ------------------------------------------------------------------------  Confirmed on  2019 - 14:00:20 by CORINA Leiva  ------------------------------------------------------------------------    < end of copied text >

## 2019-06-21 NOTE — PROGRESS NOTE ADULT - SUBJECTIVE AND OBJECTIVE BOX
Patient is a 85y old  Male who presents with a chief complaint of cough (21 Jun 2019 07:08)      SUBJECTIVE / OVERNIGHT EVENTS: Patient feels well. O/n had bursts of rapid a fib, asymptomatic. No f/c/n/v/d/cp/sob.    MEDICATIONS  (STANDING):  amitriptyline 25 milliGRAM(s) Oral at bedtime  aspirin enteric coated 81 milliGRAM(s) Oral daily  atorvastatin 40 milliGRAM(s) Oral at bedtime  buDESOnide 160 MICROgram(s)/formoterol 4.5 MICROgram(s) Inhaler 2 Puff(s) Inhalation two times a day  dextrose 5%. 1000 milliLiter(s) (50 mL/Hr) IV Continuous <Continuous>  dextrose 50% Injectable 12.5 Gram(s) IV Push once  dextrose 50% Injectable 25 Gram(s) IV Push once  dextrose 50% Injectable 25 Gram(s) IV Push once  docusate sodium 100 milliGRAM(s) Oral three times a day  enoxaparin Injectable 40 milliGRAM(s) SubCutaneous every 24 hours  insulin lispro (HumaLOG) corrective regimen sliding scale   SubCutaneous three times a day before meals  insulin lispro (HumaLOG) corrective regimen sliding scale   SubCutaneous at bedtime  isosorbide   mononitrate ER Tablet (IMDUR) 60 milliGRAM(s) Oral daily  levothyroxine 112 MICROGram(s) Oral daily  metoprolol succinate ER 50 milliGRAM(s) Oral daily  mirtazapine 15 milliGRAM(s) Oral at bedtime  polyethylene glycol 3350 17 Gram(s) Oral daily  prednisoLONE acetate 1% Suspension 1 Drop(s) Right EYE four times a day  predniSONE   Tablet 40 milliGRAM(s) Oral daily  senna 2 Tablet(s) Oral at bedtime  tamsulosin 0.4 milliGRAM(s) Oral two times a day  tiotropium 18 MICROgram(s) Capsule 1 Capsule(s) Inhalation daily    MEDICATIONS  (PRN):  acetaminophen   Tablet .. 650 milliGRAM(s) Oral every 6 hours PRN Mild Pain (1 - 3), Moderate Pain (4 - 6)  benzonatate 100 milliGRAM(s) Oral every 8 hours PRN Cough  dextrose 40% Gel 15 Gram(s) Oral once PRN Blood Glucose LESS THAN 70 milliGRAM(s)/deciliter  glucagon  Injectable 1 milliGRAM(s) IntraMuscular once PRN Glucose LESS THAN 70 milligrams/deciliter      PHYSICAL EXAM:  T(C): 36.4 (06-21-19 @ 14:27), Max: 36.7 (06-20-19 @ 21:47)  HR: 75 (06-21-19 @ 14:27) (66 - 85)  BP: 116/63 (06-21-19 @ 14:27) (97/49 - 116/63)  RR: 18 (06-21-19 @ 14:27) (17 - 18)  SpO2: 98% (06-21-19 @ 14:27) (93% - 100%)  I&O's Summary    20 Jun 2019 07:01  -  21 Jun 2019 07:00  --------------------------------------------------------  IN: 1300 mL / OUT: 1950 mL / NET: -650 mL      GENERAL: NAD, well-developed, lying in bed, pleasant  HEAD:  Atraumatic, Normocephalic, MMM  CHEST/LUNG: Clear to auscultation bilaterally; No wheeze  HEART: Regular rate and rhythm; No murmurs, rubs, or gallops  ABDOMEN: Soft, Nontender, Nondistended; Bowel sounds present  EXTREMITIES:  2+ Peripheral Pulses, No clubbing, cyanosis, or edema  PSYCH: AAOx3  NEUROLOGY: CN II-XII grossly intact, moving all extremities    LABS:  CAPILLARY BLOOD GLUCOSE      POCT Blood Glucose.: 223 mg/dL (21 Jun 2019 14:26)  POCT Blood Glucose.: 134 mg/dL (21 Jun 2019 10:22)  POCT Blood Glucose.: 158 mg/dL (20 Jun 2019 21:27)  POCT Blood Glucose.: 161 mg/dL (20 Jun 2019 17:22)                          10.9   12.43 )-----------( 184      ( 21 Jun 2019 06:20 )             33.6     06-21    141  |  109<H>  |  31<H>  ----------------------------<  111<H>  3.9   |  21<L>  |  0.98    Ca    9.2      21 Jun 2019 06:20  Phos  3.4     06-21  Mg     1.9     06-21                RADIOLOGY & ADDITIONAL TESTS:    Telemetry Personally Reviewed - SR with PVCs, rapid AFib to 140s (short burst)    Imaging Personally Reviewed -     Imaging Reviewed -     Consultant(s) Notes Reviewed -       Care Discussed with Consultants/Other Providers -

## 2019-06-21 NOTE — PROGRESS NOTE ADULT - ASSESSMENT
Patient is an 85 year old man with HTN, DM2 not on insulin, Colon Ca s/p partial colectomy (3/2019)  Melanoma s/p keytruda, PE s/p Xarelto (reported to have stopped 3 months ago)  hyperlipidemia, COPD not on home O2, CAD s/p Stent and CABG presents with cough and inability to catch breath. Patient states that for 3 days he has had a non-productive cough that occurs often that makes it very difficult to catch his breath. Cough has been occurring more frequently and is associated with chest discomfort. He notes that one night ago slept with window open and thinks he got a cold because he has some pain when swallowing in his throat.  He has been constipated for 3 days and when he bears down to have a BM causes nosebleed.  +intermittent palpitations. When EMS was called he received 3 nebulizers and oxygen and felt much better.     Upon initial presentation to ED O2 on room air was 77% which quickly improved to 98% with 2L of O2.  In the ED, 12-lead ECG noted atrial fibrillation with a rapid ventricular rate ~130 bpm.  He spontaneously converted back to sinus rhythm by the time I saw him.  He no longer complains of having palpitations, and currently denies having chest discomfort.  His main complaint is dyspnea, but reports that he feels better now.    He also mentioned that he follows with Dr. Cuevas, who is his cardiologist on [place], and had recently undergone a cardiac workup including an echocardiogram that was unremarkable as per patient.    On physical examination, he was noted to be in SR on telemetry.  He was in NAD, laying flat in bed, exhibited a Grade 2-3/6 SM, and had coarse breath sounds bilaterally on lung exam.  No LE edema noted.    His last cardiac assessment here included a cardiac catheterization in 2017 which demonstrated patent grafts and an echocardiogram that demonstrated normal LVEF.    At this time, will recommend obtaining an echocardiogram and continuing to monitor on telemetry.  His CHADSVASC score is 6.  He may very need to be started on anticoagulation pending further evaluation and consultations with his GI especially with recent colectomy for colon cancer, and findings on telemetry monitoring,  Of note, he was previously on anticoagulation for PE.    Will arrange for ischemic evaluation pending echocardiogram and symptoms. Patient is an 85 year old man with HTN, DM2 not on insulin, Colon Ca s/p partial colectomy (3/2019)  Melanoma s/p keytruda, PE s/p Xarelto (reported to have stopped 3 months ago)  hyperlipidemia, COPD not on home O2, CAD s/p Stent and CABG.    Noted to have atrial fibrillation with rapid ventricular response on telemetry (with ventricular rates to 130s bpm).  With CHADSVASC 6, recommend anticoagulation if patient able to tolerate  Will need to receive clearance for anticoagulation from GI/Gi surgery  Can consider heparin gtt challenge while in hospital

## 2019-06-21 NOTE — DIETITIAN INITIAL EVALUATION ADULT. - DIET TYPE
low sodium/consistent carbohydrate (no snacks)/DASH/TLC (sodium and cholesterol restricted diet)/regular

## 2019-06-21 NOTE — DIETITIAN INITIAL EVALUATION ADULT. - PROBLEM SELECTOR PLAN 2
Patient had an episode of tachycardia with HR up to 159, EKG was performed and is Afib vs ST c PAC. - Consider EP input to review EKG  During history bedside telemetry was sinus rhythm with occasional PAC  Monitor on Tele  ?If patient's presenting symptoms related to paroxysmal Afib?  If Afib CHADS-Vasc - 5 and would benefit from Anticoagulation as long as benefits > Risk of bleeding given CA history  Reviewed with Patients Daughter - Cate Huertas - Patient in distant past had episode of AF. Also was discontinued Xarelto due to epistaxis - she would agree with anticoagulation at this time.

## 2019-06-21 NOTE — DIETITIAN INITIAL EVALUATION ADULT. - PROBLEM SELECTOR PLAN 1
Admitted to medicine  Patient denies outright SOB but has cough that causes difficulty to "catch breath"  Initially hypoxic to 77% but quickly improved and currently satted well, 99% on room air while taking history - also noted to have a tachycardic event  up to 159bpm, possibly Afib vs ST c PAC's   CTPA completed and without PE though was non-diagnostic  for segmental and subsegmental PE.   RVP was negative  BNP was 945.2 - patient does not appear grossly volume overloaded at this time  IV Solumedrol 40mg BID x 24 hours and Azithromycin 500mg IV qD for now  Xopenex q6

## 2019-06-22 LAB
ANION GAP SERPL CALC-SCNC: 13 MMO/L — SIGNIFICANT CHANGE UP (ref 7–14)
APTT BLD: 61.1 SEC — HIGH (ref 27.5–36.3)
BUN SERPL-MCNC: 28 MG/DL — HIGH (ref 7–23)
CALCIUM SERPL-MCNC: 8.7 MG/DL — SIGNIFICANT CHANGE UP (ref 8.4–10.5)
CHLORIDE SERPL-SCNC: 109 MMOL/L — HIGH (ref 98–107)
CO2 SERPL-SCNC: 21 MMOL/L — LOW (ref 22–31)
CREAT SERPL-MCNC: 0.94 MG/DL — SIGNIFICANT CHANGE UP (ref 0.5–1.3)
GLUCOSE SERPL-MCNC: 90 MG/DL — SIGNIFICANT CHANGE UP (ref 70–99)
HCT VFR BLD CALC: 34.2 % — LOW (ref 39–50)
HGB BLD-MCNC: 11 G/DL — LOW (ref 13–17)
MAGNESIUM SERPL-MCNC: 2 MG/DL — SIGNIFICANT CHANGE UP (ref 1.6–2.6)
MCHC RBC-ENTMCNC: 28.6 PG — SIGNIFICANT CHANGE UP (ref 27–34)
MCHC RBC-ENTMCNC: 32.2 % — SIGNIFICANT CHANGE UP (ref 32–36)
MCV RBC AUTO: 89.1 FL — SIGNIFICANT CHANGE UP (ref 80–100)
NRBC # FLD: 0 K/UL — SIGNIFICANT CHANGE UP (ref 0–0)
OB PNL STL: NEGATIVE — SIGNIFICANT CHANGE UP
PHOSPHATE SERPL-MCNC: 3.5 MG/DL — SIGNIFICANT CHANGE UP (ref 2.5–4.5)
PLATELET # BLD AUTO: 190 K/UL — SIGNIFICANT CHANGE UP (ref 150–400)
PMV BLD: 9.9 FL — SIGNIFICANT CHANGE UP (ref 7–13)
POTASSIUM SERPL-MCNC: 3.8 MMOL/L — SIGNIFICANT CHANGE UP (ref 3.5–5.3)
POTASSIUM SERPL-SCNC: 3.8 MMOL/L — SIGNIFICANT CHANGE UP (ref 3.5–5.3)
RBC # BLD: 3.84 M/UL — LOW (ref 4.2–5.8)
RBC # FLD: 14.8 % — HIGH (ref 10.3–14.5)
SODIUM SERPL-SCNC: 143 MMOL/L — SIGNIFICANT CHANGE UP (ref 135–145)
WBC # BLD: 8.06 K/UL — SIGNIFICANT CHANGE UP (ref 3.8–10.5)
WBC # FLD AUTO: 8.06 K/UL — SIGNIFICANT CHANGE UP (ref 3.8–10.5)

## 2019-06-22 PROCEDURE — 99232 SBSQ HOSP IP/OBS MODERATE 35: CPT

## 2019-06-22 PROCEDURE — 99233 SBSQ HOSP IP/OBS HIGH 50: CPT

## 2019-06-22 RX ORDER — HEPARIN SODIUM 5000 [USP'U]/ML
INJECTION INTRAVENOUS; SUBCUTANEOUS
Qty: 25000 | Refills: 0 | Status: DISCONTINUED | OUTPATIENT
Start: 2019-06-22 | End: 2019-06-23

## 2019-06-22 RX ORDER — HEPARIN SODIUM 5000 [USP'U]/ML
2500 INJECTION INTRAVENOUS; SUBCUTANEOUS EVERY 6 HOURS
Refills: 0 | Status: DISCONTINUED | OUTPATIENT
Start: 2019-06-22 | End: 2019-06-23

## 2019-06-22 RX ORDER — HEPARIN SODIUM 5000 [USP'U]/ML
5500 INJECTION INTRAVENOUS; SUBCUTANEOUS EVERY 6 HOURS
Refills: 0 | Status: DISCONTINUED | OUTPATIENT
Start: 2019-06-22 | End: 2019-06-23

## 2019-06-22 RX ADMIN — MIRTAZAPINE 15 MILLIGRAM(S): 45 TABLET, ORALLY DISINTEGRATING ORAL at 21:53

## 2019-06-22 RX ADMIN — Medication 50 MILLIGRAM(S): at 13:11

## 2019-06-22 RX ADMIN — Medication 25 MILLIGRAM(S): at 21:53

## 2019-06-22 RX ADMIN — TAMSULOSIN HYDROCHLORIDE 0.4 MILLIGRAM(S): 0.4 CAPSULE ORAL at 18:28

## 2019-06-22 RX ADMIN — Medication 100 MILLIGRAM(S): at 06:20

## 2019-06-22 RX ADMIN — BUDESONIDE AND FORMOTEROL FUMARATE DIHYDRATE 2 PUFF(S): 160; 4.5 AEROSOL RESPIRATORY (INHALATION) at 09:56

## 2019-06-22 RX ADMIN — HEPARIN SODIUM 1200 UNIT(S)/HR: 5000 INJECTION INTRAVENOUS; SUBCUTANEOUS at 15:55

## 2019-06-22 RX ADMIN — SENNA PLUS 2 TABLET(S): 8.6 TABLET ORAL at 21:54

## 2019-06-22 RX ADMIN — ENOXAPARIN SODIUM 40 MILLIGRAM(S): 100 INJECTION SUBCUTANEOUS at 09:56

## 2019-06-22 RX ADMIN — Medication 1 DROP(S): at 21:51

## 2019-06-22 RX ADMIN — Medication 112 MICROGRAM(S): at 06:20

## 2019-06-22 RX ADMIN — Medication 1 DROP(S): at 09:56

## 2019-06-22 RX ADMIN — ATORVASTATIN CALCIUM 40 MILLIGRAM(S): 80 TABLET, FILM COATED ORAL at 21:53

## 2019-06-22 RX ADMIN — TIOTROPIUM BROMIDE 1 CAPSULE(S): 18 CAPSULE ORAL; RESPIRATORY (INHALATION) at 09:55

## 2019-06-22 RX ADMIN — Medication 81 MILLIGRAM(S): at 13:08

## 2019-06-22 RX ADMIN — BUDESONIDE AND FORMOTEROL FUMARATE DIHYDRATE 2 PUFF(S): 160; 4.5 AEROSOL RESPIRATORY (INHALATION) at 21:53

## 2019-06-22 RX ADMIN — Medication 100 MILLIGRAM(S): at 21:53

## 2019-06-22 RX ADMIN — TAMSULOSIN HYDROCHLORIDE 0.4 MILLIGRAM(S): 0.4 CAPSULE ORAL at 06:20

## 2019-06-22 RX ADMIN — Medication 1 DROP(S): at 02:38

## 2019-06-22 RX ADMIN — Medication 100 MILLIGRAM(S): at 13:08

## 2019-06-22 RX ADMIN — Medication 1 DROP(S): at 13:09

## 2019-06-22 RX ADMIN — HEPARIN SODIUM 1200 UNIT(S)/HR: 5000 INJECTION INTRAVENOUS; SUBCUTANEOUS at 22:48

## 2019-06-22 RX ADMIN — Medication 10 MILLIGRAM(S): at 18:28

## 2019-06-22 RX ADMIN — Medication 2: at 13:08

## 2019-06-22 RX ADMIN — POLYETHYLENE GLYCOL 3350 17 GRAM(S): 17 POWDER, FOR SOLUTION ORAL at 13:08

## 2019-06-22 RX ADMIN — Medication 40 MILLIGRAM(S): at 06:21

## 2019-06-22 NOTE — PROGRESS NOTE ADULT - SUBJECTIVE AND OBJECTIVE BOX
Patient is a 85y old  Male who presents with a chief complaint of cough (22 Jun 2019 05:48)      SUBJECTIVE / OVERNIGHT EVENTS: patient seen and examined by bedside, denies headache, dizziness, SOB, CP, Palpitations , N/V/D, abdominal pain  Tele :NSR at 74     MEDICATIONS  (STANDING):  amitriptyline 25 milliGRAM(s) Oral at bedtime  aspirin enteric coated 81 milliGRAM(s) Oral daily  atorvastatin 40 milliGRAM(s) Oral at bedtime  buDESOnide 160 MICROgram(s)/formoterol 4.5 MICROgram(s) Inhaler 2 Puff(s) Inhalation two times a day  dextrose 5%. 1000 milliLiter(s) (50 mL/Hr) IV Continuous <Continuous>  dextrose 50% Injectable 12.5 Gram(s) IV Push once  dextrose 50% Injectable 25 Gram(s) IV Push once  dextrose 50% Injectable 25 Gram(s) IV Push once  docusate sodium 100 milliGRAM(s) Oral three times a day  enoxaparin Injectable 40 milliGRAM(s) SubCutaneous every 24 hours  heparin  Infusion.  Unit(s)/Hr (12 mL/Hr) IV Continuous <Continuous>  insulin lispro (HumaLOG) corrective regimen sliding scale   SubCutaneous three times a day before meals  insulin lispro (HumaLOG) corrective regimen sliding scale   SubCutaneous at bedtime  isosorbide   mononitrate ER Tablet (IMDUR) 60 milliGRAM(s) Oral daily  levothyroxine 112 MICROGram(s) Oral daily  metoprolol succinate ER 50 milliGRAM(s) Oral daily  mirtazapine 15 milliGRAM(s) Oral at bedtime  polyethylene glycol 3350 17 Gram(s) Oral daily  prednisoLONE acetate 1% Suspension 1 Drop(s) Right EYE four times a day  predniSONE   Tablet 40 milliGRAM(s) Oral daily  senna 2 Tablet(s) Oral at bedtime  tamsulosin 0.4 milliGRAM(s) Oral two times a day  tiotropium 18 MICROgram(s) Capsule 1 Capsule(s) Inhalation daily    MEDICATIONS  (PRN):  acetaminophen   Tablet .. 650 milliGRAM(s) Oral every 6 hours PRN Mild Pain (1 - 3), Moderate Pain (4 - 6)  benzonatate 100 milliGRAM(s) Oral every 8 hours PRN Cough  dextrose 40% Gel 15 Gram(s) Oral once PRN Blood Glucose LESS THAN 70 milliGRAM(s)/deciliter  glucagon  Injectable 1 milliGRAM(s) IntraMuscular once PRN Glucose LESS THAN 70 milligrams/deciliter  heparin  Injectable 5500 Unit(s) IV Push every 6 hours PRN For aPTT less than 40  heparin  Injectable 2500 Unit(s) IV Push every 6 hours PRN For aPTT between 40 - 57      Vital Signs Last 24 Hrs  T(C): 36.7 (22 Jun 2019 13:07), Max: 36.8 (22 Jun 2019 06:19)  T(F): 98 (22 Jun 2019 13:07), Max: 98.2 (22 Jun 2019 06:19)  HR: 78 (22 Jun 2019 13:07) (62 - 78)  BP: 95/52 (22 Jun 2019 13:07) (95/52 - 112/70)  BP(mean): --  RR: 18 (22 Jun 2019 13:07) (18 - 18)  SpO2: 96% (22 Jun 2019 13:07) (96% - 100%)  CAPILLARY BLOOD GLUCOSE      POCT Blood Glucose.: 183 mg/dL (22 Jun 2019 12:26)  POCT Blood Glucose.: 123 mg/dL (22 Jun 2019 09:06)  POCT Blood Glucose.: 133 mg/dL (21 Jun 2019 22:27)  POCT Blood Glucose.: 114 mg/dL (21 Jun 2019 17:39)    I&O's Summary    21 Jun 2019 07:01  -  22 Jun 2019 07:00  --------------------------------------------------------  IN: 400 mL / OUT: 400 mL / NET: 0 mL        PHYSICAL EXAM:  GENERAL: NAD, well-developed, lying in bed, pleasant  HEAD:  Atraumatic, Normocephalic, MMM  CHEST/LUNG: Clear to auscultation bilaterally; No wheeze  HEART: Regular rate and rhythm; No murmurs, rubs, or gallops  ABDOMEN: Soft, Nontender, Nondistended; Bowel sounds present  EXTREMITIES:  2+ Peripheral Pulses, No clubbing, cyanosis, or edema  PSYCH: AAOx3  NEUROLOGY: CN II-XII grossly intact, moving all extremities      LABS:                        11.0   8.06  )-----------( 190      ( 22 Jun 2019 07:18 )             34.2     06-22    143  |  109<H>  |  28<H>  ----------------------------<  90  3.8   |  21<L>  |  0.94    Ca    8.7      22 Jun 2019 07:18  Phos  3.5     06-22  Mg     2.0     06-22                RADIOLOGY & ADDITIONAL TESTS:  < from: Nuclear Stress Test-Pharmacologic (06.21.19 @ 10:04) >  IMPRESSIONS:Normal Study  * Myocardial Perfusion SPECT results are normal.  * Review of raw data shows: The study is of good technical  quality.  * There is a small, mild defect in basal inferior wall  that corrects with prone imaging, suggestive of  attenuation artifact.  * Post-stress gated wall motion analysis was performed  (LVEF = 67 %;LVEDV = 78 ml.), revealing normal LV  function. RVfunction appeared normal.  * No clear evidence of ischemia.    < end of copied text >    Imaging Personally Reviewed:    Consultant(s) Notes Reviewed: cardiology      Care Discussed with Consultants/Other Providers:

## 2019-06-22 NOTE — PROGRESS NOTE ADULT - ASSESSMENT
Patient is an 85 year old man with HTN, DM2 not on insulin, Colon Ca s/p partial colectomy (3/2019)  Melanoma s/p keytruda, PE s/p Xarelto (reported to have stopped 3 months ago)  hyperlipidemia, COPD not on home O2, CAD s/p Stent and CABG.    Noted to have atrial fibrillation with rapid ventricular response on telemetry (with ventricular rates to 130s bpm).  With CHADSVASC 6, recommend anticoagulation if patient able to tolerate  Will need to receive clearance for anticoagulation from GI/Gi surgery  Can consider heparin gtt challenge while in hospital Patient is an 85 year old man with HTN, DM2 not on insulin, Colon Ca s/p partial colectomy (3/2019)  Melanoma s/p keytruda, PE s/p Xarelto (reported to have stopped 3 months ago)  hyperlipidemia, COPD not on home O2, CAD s/p Stent and CABG.    Noted to have atrial fibrillation with rapid ventricular response on telemetry (with ventricular rates to 130s bpm) on morning of 6/21.  Currently in SR  With CHADSVASC 6, recommend anticoagulation if patient able to tolerate  Will need to receive clearance for anticoagulation from GI/Gi surgery  Can consider heparin gtt challenge while in hospital

## 2019-06-23 LAB
ANION GAP SERPL CALC-SCNC: 12 MMO/L — SIGNIFICANT CHANGE UP (ref 7–14)
APTT BLD: 83.7 SEC — HIGH (ref 27.5–36.3)
BUN SERPL-MCNC: 26 MG/DL — HIGH (ref 7–23)
CALCIUM SERPL-MCNC: 8.7 MG/DL — SIGNIFICANT CHANGE UP (ref 8.4–10.5)
CHLORIDE SERPL-SCNC: 107 MMOL/L — SIGNIFICANT CHANGE UP (ref 98–107)
CO2 SERPL-SCNC: 22 MMOL/L — SIGNIFICANT CHANGE UP (ref 22–31)
CREAT SERPL-MCNC: 0.92 MG/DL — SIGNIFICANT CHANGE UP (ref 0.5–1.3)
GLUCOSE SERPL-MCNC: 105 MG/DL — HIGH (ref 70–99)
HCT VFR BLD CALC: 34.8 % — LOW (ref 39–50)
HGB BLD-MCNC: 11.3 G/DL — LOW (ref 13–17)
MAGNESIUM SERPL-MCNC: 2.1 MG/DL — SIGNIFICANT CHANGE UP (ref 1.6–2.6)
MCHC RBC-ENTMCNC: 28.3 PG — SIGNIFICANT CHANGE UP (ref 27–34)
MCHC RBC-ENTMCNC: 32.5 % — SIGNIFICANT CHANGE UP (ref 32–36)
MCV RBC AUTO: 87 FL — SIGNIFICANT CHANGE UP (ref 80–100)
NRBC # FLD: 0 K/UL — SIGNIFICANT CHANGE UP (ref 0–0)
PHOSPHATE SERPL-MCNC: 3.7 MG/DL — SIGNIFICANT CHANGE UP (ref 2.5–4.5)
PLATELET # BLD AUTO: 188 K/UL — SIGNIFICANT CHANGE UP (ref 150–400)
PMV BLD: 9.7 FL — SIGNIFICANT CHANGE UP (ref 7–13)
POTASSIUM SERPL-MCNC: 3.7 MMOL/L — SIGNIFICANT CHANGE UP (ref 3.5–5.3)
POTASSIUM SERPL-SCNC: 3.7 MMOL/L — SIGNIFICANT CHANGE UP (ref 3.5–5.3)
RBC # BLD: 4 M/UL — LOW (ref 4.2–5.8)
RBC # FLD: 14.4 % — SIGNIFICANT CHANGE UP (ref 10.3–14.5)
SODIUM SERPL-SCNC: 141 MMOL/L — SIGNIFICANT CHANGE UP (ref 135–145)
WBC # BLD: 8.02 K/UL — SIGNIFICANT CHANGE UP (ref 3.8–10.5)
WBC # FLD AUTO: 8.02 K/UL — SIGNIFICANT CHANGE UP (ref 3.8–10.5)

## 2019-06-23 PROCEDURE — 99233 SBSQ HOSP IP/OBS HIGH 50: CPT

## 2019-06-23 PROCEDURE — 99232 SBSQ HOSP IP/OBS MODERATE 35: CPT

## 2019-06-23 RX ORDER — SODIUM CHLORIDE 9 MG/ML
250 INJECTION INTRAMUSCULAR; INTRAVENOUS; SUBCUTANEOUS ONCE
Refills: 0 | Status: COMPLETED | OUTPATIENT
Start: 2019-06-23 | End: 2019-06-23

## 2019-06-23 RX ORDER — RIVAROXABAN 15 MG-20MG
15 KIT ORAL DAILY
Refills: 0 | Status: DISCONTINUED | OUTPATIENT
Start: 2019-06-23 | End: 2019-06-24

## 2019-06-23 RX ADMIN — HEPARIN SODIUM 1200 UNIT(S)/HR: 5000 INJECTION INTRAVENOUS; SUBCUTANEOUS at 05:17

## 2019-06-23 RX ADMIN — Medication 25 MILLIGRAM(S): at 21:29

## 2019-06-23 RX ADMIN — SODIUM CHLORIDE 500 MILLILITER(S): 9 INJECTION INTRAMUSCULAR; INTRAVENOUS; SUBCUTANEOUS at 21:57

## 2019-06-23 RX ADMIN — Medication 81 MILLIGRAM(S): at 11:23

## 2019-06-23 RX ADMIN — Medication 112 MICROGRAM(S): at 05:18

## 2019-06-23 RX ADMIN — Medication 100 MILLIGRAM(S): at 14:57

## 2019-06-23 RX ADMIN — Medication 1 DROP(S): at 02:01

## 2019-06-23 RX ADMIN — ISOSORBIDE MONONITRATE 60 MILLIGRAM(S): 60 TABLET, EXTENDED RELEASE ORAL at 11:23

## 2019-06-23 RX ADMIN — RIVAROXABAN 15 MILLIGRAM(S): KIT at 17:37

## 2019-06-23 RX ADMIN — SENNA PLUS 2 TABLET(S): 8.6 TABLET ORAL at 21:29

## 2019-06-23 RX ADMIN — Medication 1 DROP(S): at 07:29

## 2019-06-23 RX ADMIN — Medication 1 DROP(S): at 14:57

## 2019-06-23 RX ADMIN — TAMSULOSIN HYDROCHLORIDE 0.4 MILLIGRAM(S): 0.4 CAPSULE ORAL at 05:18

## 2019-06-23 RX ADMIN — BUDESONIDE AND FORMOTEROL FUMARATE DIHYDRATE 2 PUFF(S): 160; 4.5 AEROSOL RESPIRATORY (INHALATION) at 08:46

## 2019-06-23 RX ADMIN — Medication 50 MILLIGRAM(S): at 05:18

## 2019-06-23 RX ADMIN — TAMSULOSIN HYDROCHLORIDE 0.4 MILLIGRAM(S): 0.4 CAPSULE ORAL at 17:37

## 2019-06-23 RX ADMIN — Medication 2: at 12:53

## 2019-06-23 RX ADMIN — POLYETHYLENE GLYCOL 3350 17 GRAM(S): 17 POWDER, FOR SOLUTION ORAL at 11:23

## 2019-06-23 RX ADMIN — BUDESONIDE AND FORMOTEROL FUMARATE DIHYDRATE 2 PUFF(S): 160; 4.5 AEROSOL RESPIRATORY (INHALATION) at 21:29

## 2019-06-23 RX ADMIN — TIOTROPIUM BROMIDE 1 CAPSULE(S): 18 CAPSULE ORAL; RESPIRATORY (INHALATION) at 08:48

## 2019-06-23 RX ADMIN — MIRTAZAPINE 15 MILLIGRAM(S): 45 TABLET, ORALLY DISINTEGRATING ORAL at 21:29

## 2019-06-23 RX ADMIN — Medication 100 MILLIGRAM(S): at 05:18

## 2019-06-23 RX ADMIN — Medication 100 MILLIGRAM(S): at 21:29

## 2019-06-23 RX ADMIN — Medication 40 MILLIGRAM(S): at 05:18

## 2019-06-23 RX ADMIN — ATORVASTATIN CALCIUM 40 MILLIGRAM(S): 80 TABLET, FILM COATED ORAL at 21:29

## 2019-06-23 RX ADMIN — Medication 1 DROP(S): at 19:03

## 2019-06-23 NOTE — PROGRESS NOTE ADULT - SUBJECTIVE AND OBJECTIVE BOX
Patient is a 85y old  Male who presents with a chief complaint of cough (23 Jun 2019 06:20)      SUBJECTIVE / OVERNIGHT EVENTS: patient seen and examined by bedside, denies headache, dizziness, SOB, CP, Palpitations , N/V/D, abdominal pain  no acute events over night  tele: NSR in 65-70 , PVCs       MEDICATIONS  (STANDING):  amitriptyline 25 milliGRAM(s) Oral at bedtime  aspirin enteric coated 81 milliGRAM(s) Oral daily  atorvastatin 40 milliGRAM(s) Oral at bedtime  buDESOnide 160 MICROgram(s)/formoterol 4.5 MICROgram(s) Inhaler 2 Puff(s) Inhalation two times a day  dextrose 5%. 1000 milliLiter(s) (50 mL/Hr) IV Continuous <Continuous>  dextrose 50% Injectable 12.5 Gram(s) IV Push once  dextrose 50% Injectable 25 Gram(s) IV Push once  dextrose 50% Injectable 25 Gram(s) IV Push once  docusate sodium 100 milliGRAM(s) Oral three times a day  enoxaparin Injectable 40 milliGRAM(s) SubCutaneous every 24 hours  heparin  Infusion.  Unit(s)/Hr (12 mL/Hr) IV Continuous <Continuous>  insulin lispro (HumaLOG) corrective regimen sliding scale   SubCutaneous three times a day before meals  insulin lispro (HumaLOG) corrective regimen sliding scale   SubCutaneous at bedtime  isosorbide   mononitrate ER Tablet (IMDUR) 60 milliGRAM(s) Oral daily  levothyroxine 112 MICROGram(s) Oral daily  metoprolol succinate ER 50 milliGRAM(s) Oral daily  mirtazapine 15 milliGRAM(s) Oral at bedtime  polyethylene glycol 3350 17 Gram(s) Oral daily  prednisoLONE acetate 1% Suspension 1 Drop(s) Right EYE four times a day  predniSONE   Tablet 40 milliGRAM(s) Oral daily  senna 2 Tablet(s) Oral at bedtime  tamsulosin 0.4 milliGRAM(s) Oral two times a day  tiotropium 18 MICROgram(s) Capsule 1 Capsule(s) Inhalation daily    MEDICATIONS  (PRN):  acetaminophen   Tablet .. 650 milliGRAM(s) Oral every 6 hours PRN Mild Pain (1 - 3), Moderate Pain (4 - 6)  benzonatate 100 milliGRAM(s) Oral every 8 hours PRN Cough  dextrose 40% Gel 15 Gram(s) Oral once PRN Blood Glucose LESS THAN 70 milliGRAM(s)/deciliter  glucagon  Injectable 1 milliGRAM(s) IntraMuscular once PRN Glucose LESS THAN 70 milligrams/deciliter  heparin  Injectable 5500 Unit(s) IV Push every 6 hours PRN For aPTT less than 40  heparin  Injectable 2500 Unit(s) IV Push every 6 hours PRN For aPTT between 40 - 57      Vital Signs Last 24 Hrs  T(C): 36.6 (23 Jun 2019 05:14), Max: 36.7 (22 Jun 2019 21:49)  T(F): 97.9 (23 Jun 2019 05:14), Max: 98.1 (22 Jun 2019 21:49)  HR: 70 (23 Jun 2019 11:23) (62 - 70)  BP: 101/65 (23 Jun 2019 11:23) (101/65 - 128/63)  BP(mean): --  RR: 18 (23 Jun 2019 05:14) (18 - 18)  SpO2: 97% (23 Jun 2019 05:14) (97% - 97%)  CAPILLARY BLOOD GLUCOSE      POCT Blood Glucose.: 178 mg/dL (23 Jun 2019 12:51)  POCT Blood Glucose.: 124 mg/dL (23 Jun 2019 09:02)  POCT Blood Glucose.: 152 mg/dL (22 Jun 2019 21:43)  POCT Blood Glucose.: 130 mg/dL (22 Jun 2019 17:47)    I&O's Summary    22 Jun 2019 07:01  -  23 Jun 2019 07:00  --------------------------------------------------------  IN: 288 mL / OUT: 400 mL / NET: -112 mL        PHYSICAL EXAM:  GENERAL: NAD, well-developed, lying in bed, pleasant  HEAD:  Atraumatic, Normocephalic, MMM  CHEST/LUNG: Clear to auscultation bilaterally; No wheeze  HEART: Regular rate and rhythm; No murmurs, rubs, or gallops  ABDOMEN: Soft, Nontender, Nondistended; Bowel sounds present  EXTREMITIES:  2+ Peripheral Pulses, No clubbing, cyanosis, or edema  PSYCH: AAOx3  NEUROLOGY: CN II-XII grossly intact, moving all extremities        LABS:                        11.3   8.02  )-----------( 188      ( 23 Jun 2019 04:23 )             34.8     06-23    141  |  107  |  26<H>  ----------------------------<  105<H>  3.7   |  22  |  0.92    Ca    8.7      23 Jun 2019 04:23  Phos  3.7     06-23  Mg     2.1     06-23      PTT - ( 23 Jun 2019 04:23 )  PTT:83.7 SEC          RADIOLOGY & ADDITIONAL TESTS:  < from: Nuclear Stress Test-Pharmacologic (06.21.19 @ 10:04) >  IMPRESSIONS:Normal Study  * Myocardial Perfusion SPECT results are normal.  * Review of raw data shows: The study is of good technical  quality.  * There is a small, mild defect in basal inferior wall  that corrects with prone imaging, suggestive of  attenuation artifact.  * Post-stress gated wall motion analysis was performed  (LVEF = 67 %;LVEDV = 78 ml.), revealing normal LV  function. RVfunction appeared normal.  * No clear evidence of ischemia.        Consultant(s) Notes Reviewed:  cardiology     Care Discussed with Consultants/Other Providers:

## 2019-06-23 NOTE — PROGRESS NOTE ADULT - SUBJECTIVE AND OBJECTIVE BOX
Cardiology/Vascular Medicine Inpatient Progress Note    No new complaints  Telemetry: SR, PACs, PVCs. No episodes of AFib noted on telemetry since yesterday morning's event    With CHADSVASC 6, recommend anticoagulation  Receiving heparin gtt challenge  If no evidence of GIB, can restart DOAC    Vital Signs Last 24 Hrs  T(C): 36.6 (2019 05:14), Max: 36.7 (2019 13:07)  T(F): 97.9 (2019 05:14), Max: 98.1 (2019 21:49)  HR: 62 (2019 05:14) (62 - 78)  BP: 116/58 (2019 05:14) (95/52 - 128/63)  BP(mean): --  RR: 18 (2019 05:14) (18 - 18)  SpO2: 97% (2019 05:14) (96% - 97%)    Appearance: NAD, laying flat in bed  HEENT:   Normal oral mucosa, PERRL, EOMI	  Cardiovascular: Normal S1 S2, No JVD, 2-3/6 SM  Respiratory: Coarse breath sounds bilaterally  Psychiatry: Awake, alert  Gastrointestinal:  Soft, Non-tender, + BS	  Skin: No rashes, No ecchymoses, No cyanosis	  Neurologic: Non-focal  Extremities: Normal range of motion, No clubbing, cyanosis or edema  Vascular: Peripheral pulses palpable 2+ bilaterally    MEDICATIONS  (STANDING):  amitriptyline 25 milliGRAM(s) Oral at bedtime  aspirin enteric coated 81 milliGRAM(s) Oral daily  atorvastatin 40 milliGRAM(s) Oral at bedtime  buDESOnide 160 MICROgram(s)/formoterol 4.5 MICROgram(s) Inhaler 2 Puff(s) Inhalation two times a day  dextrose 5%. 1000 milliLiter(s) (50 mL/Hr) IV Continuous <Continuous>  dextrose 50% Injectable 12.5 Gram(s) IV Push once  dextrose 50% Injectable 25 Gram(s) IV Push once  dextrose 50% Injectable 25 Gram(s) IV Push once  docusate sodium 100 milliGRAM(s) Oral three times a day  enoxaparin Injectable 40 milliGRAM(s) SubCutaneous every 24 hours  heparin  Infusion.  Unit(s)/Hr (12 mL/Hr) IV Continuous <Continuous>  insulin lispro (HumaLOG) corrective regimen sliding scale   SubCutaneous three times a day before meals  insulin lispro (HumaLOG) corrective regimen sliding scale   SubCutaneous at bedtime  isosorbide   mononitrate ER Tablet (IMDUR) 60 milliGRAM(s) Oral daily  levothyroxine 112 MICROGram(s) Oral daily  metoprolol succinate ER 50 milliGRAM(s) Oral daily  mirtazapine 15 milliGRAM(s) Oral at bedtime  polyethylene glycol 3350 17 Gram(s) Oral daily  prednisoLONE acetate 1% Suspension 1 Drop(s) Right EYE four times a day  predniSONE   Tablet 40 milliGRAM(s) Oral daily  senna 2 Tablet(s) Oral at bedtime  tamsulosin 0.4 milliGRAM(s) Oral two times a day  tiotropium 18 MICROgram(s) Capsule 1 Capsule(s) Inhalation daily    MEDICATIONS  (PRN):  acetaminophen   Tablet .. 650 milliGRAM(s) Oral every 6 hours PRN Mild Pain (1 - 3), Moderate Pain (4 - 6)  benzonatate 100 milliGRAM(s) Oral every 8 hours PRN Cough  dextrose 40% Gel 15 Gram(s) Oral once PRN Blood Glucose LESS THAN 70 milliGRAM(s)/deciliter  glucagon  Injectable 1 milliGRAM(s) IntraMuscular once PRN Glucose LESS THAN 70 milligrams/deciliter  heparin  Injectable 5500 Unit(s) IV Push every 6 hours PRN For aPTT less than 40  heparin  Injectable 2500 Unit(s) IV Push every 6 hours PRN For aPTT between 40 - 57      LABS:	 	                                 11.3   8.02  )-----------( 188      ( 2019 04:23 )             34.8         141  |  107  |  26<H>  ----------------------------<  105<H>  3.7   |  22  |  0.92    Ca    8.7      2019 04:23  Phos  3.7     -  Mg     2.1     -    PTT - ( 2019 04:23 )  PTT:83.7 SEC      < from: Cardiac Cath Lab - Adult (17 @ 14:14) >  [facility]  Department of Cardiology  [address] [place] [address]  [phone]  Cath Lab Report -- Comprehensive Report  Patient: ÁNGEL GARZA  Study date: 2017  Account number: [id number]  MR number:[id number]  : 05/10/1934  Gender: Male  Race: O  Case Physician(s):  Shanti Bowie M.D.  Referring Physician:  INDICATIONS: Unstable angina - CCS4.  HISTORY: The patient has a history of coronary artery disease. The patient  has hypertension, medication-treated dyslipidemia, low HDL, and a family  history of coronary artery disease.  PROCEDURE:  --  Left heart catheterization with ventriculography.  --  Left coronary angiography.  --  Right coronary angiography.  --  Bypass graft angiography.  TECHNIQUE: The risks and alternatives of the procedures and conscious  sedation were explained to the patient and informed consent was obtained.  Cardiac catheterization performed electively.  Local anesthetic given. Right femoral artery access. Localanesthetic  given. A 5FR SHEATH PINNACLE was inserted in the vessel, utilizing the  modified Seldinger technique. Left heart catheterization. Ventriculography  was performed. A 5FR FR4.0 EXPO catheter was utilized. After recording  ascending aortic pressure, the catheter was advanced across the aortic  valve and left ventricular pressure was recorded. Imaging was performed  using an CAVAZOS projection. Post-ventriculography LV pressure was obtained.  The catheter was gradually withdrawn into the aorta under continuous  pressure monitoring and aortic pressure was recorded. Left coronary artery  angiography. The vessel was injected utilizing a 5FR FL4.0 EXPO catheter  and positioned in the vessel ostia under fluoroscopic guidance.  Angiography was performed in multiple projections using hand-injection of  contrast. Right coronary artery angiography. The vessel was injected  utilizing a 5FR FR4.0 EXPO catheter and positioned in the vessel ostia  under fluoroscopic guidance. Angiography was performed in multiple  projections using hand-injection of contrast. Graft angiography. The  vessel was injected utilizing a 5FR FR4.0 EXPO catheter and positioned in  the vessel ostia under fluoroscopic guidance. Angiography was performed in  multiple projections using hand-injection of contrast. RADIATION EXPOSURE:  5.5 min.  CONTRAST GIVEN: Omnipaque 28 ml.  VENTRICLES: EF estimated was 55 %.  CORONARY VESSELS: The coronary circulation is left dominant.  LM:   --  LM: Normal.  LAD:   --  Proximal LAD: There was a 100 % stenosis.  --  D1: There was a tubular 25 % stenosis at the site of a prior  angioplasty with stent placement.  CX:   --  LPDA: There was a 100 % stenosis.  RCA:   --  Mid RCA: There was a 100 % stenosis.  GRAFTS:   --  Graft to the mid LAD: The graft was a normal sized LIMA.  Distal vessel angiography showed mild diffuse disease.  --  Graft to the LPDA: The graft was a normal sized saphenous vein graft  from the aorta. Graft angiography showed mild atherosclerosis. Distal  vessel angiography showed mild diffuse disease.  COMPLICATIONS: There were no complications.  DIAGNOSTIC RECOMMENDATIONS: Medical management is recommended.  Prepared and signed by  Shanti Bowie M.D.  Signed 2017 18:16:49  HEMODYNAMIC TABLES  Pressures:  Baseline/ Room Air  Pressures:  - HR: 69  Pressures:  - Rhythm:  Pressures:  -- Aortic Pressure (S/D/M): 113/64/84  Pressures:  -- Left Ventricle (s/edp): 118/12/--  Outputs:  Baseline/ Room Air  Outputs:  -- CALCULATIONS[age]  Outputs:  -- CALCULATIONS: Body Surface Area: 1.78  Outputs:  -- CALCULATIONS: Height in cm: 167.00  Outputs:  -- CALCULATIONS: Sex: Male  Outputs:  -- CALCULATIONS: Weight in k.70    < end of copied text >      < from: CT Angio Chest w/ IV Cont (19 @ 21:39) >  EXAM:  CT ANGIO CHEST (W)AW IC        PROCEDURE DATE:  2019         INTERPRETATION:  CLINICAL INFORMATION: Dyspnea. Evaluate for pulmonary   embolus    COMPARISON: None.    PROCEDURE:   CT Angiography of the Chest.  90 ml of Omnipaque 350 was injected intravenously. 10 ml were discarded.  Sagittal and coronal reformats were performed as well as 3D (MIP)   reconstructions.      FINDINGS:    LUNGS AND AIRWAYS: Patent central airways.  Punctate left apical nodular   opacities likely distal mucus impaction. Bilateral parenchymal scarring.    PLEURA: No pleural effusion.    MEDIASTINUM AND CECY: No lymphadenopathy.    VESSELS: Poor bolus opacification and respiratory motion limits   evaluation for pulmonary embolus. No central or lobar pulmonary embolus.   Evaluation of the segmental and subsegmental pulmonary arteries is   nondiagnostic. Normal left-sided 3 vessel aortic arch.    HEART: Heart size is normal. No pericardial effusion.    CHEST WALL AND LOWER NECK: Status post midline sternotomy    VISUALIZED UPPER ABDOMEN: 2 cm heterogeneous left adrenal nodule. The   gallbladder is surgically absent. 1.7 cm indeterminate left renal lesion.    BONES: There is multilevel thoracic spondylosis.    IMPRESSION:     No central or lobar acute pulmonary arterial embolus. Nondiagnostic study   for acute pulmonary arterial embolus of the segmental and subsegmental   pulmonary arteries.    Indeterminate 2 cm left adrenal lesion.    1.7 cm indeterminate left renal lesion.      CR PENN M.D., RADIOLOGY RESIDENT  This document has been electronically signed.  AMERICA BULL M.D., ATTENDING RADIOLOGIST  This document has been electronically signed. 2019 12:10AM      < from: Transthoracic Echocardiogram (19 @ 09:52) >    Patient name: Ángel Garza  YOB: 1934   Age: 85 (M)   MR#: [id number]  Study Date: 2019  Location: [facility] Sonographer: Lauren Finkelstein, [facility]  Study quality: Technically good  Referring Physician: Edwar Wilkins MD  Blood Pressure: 102/61 mmHg  Height: 167 cm  Weight: 67 kg  BSA: 1.8 m2  ------------------------------------------------------------------------  PROCEDURE: Transthoracic echocardiogram with 2-D, M-Mode  and complete spectral and color flow Doppler.  INDICATION: Abnormal electrocardiogram (ECG) (EKG)  (R94.31), Shortness of breath (R06.02)  ------------------------------------------------------------------------  DIMENSIONS:  Dimensions:     Normal Values:  LA:     4.3 cm    2.0 - 4.0 cm  Ao:     3.0 cm    2.0- 3.8 cm  SEPTUM: 0.8 cm    0.6 - 1.2 cm  PWT:    0.8 cm    0.6 - 1.1 cm  LVIDd:  3.8 cm    3.0 - 5.6 cm  LVIDs:  2.5 cm    1.8 - 4.0 cm  Derived Variables:  LVMI: 49 g/m2  RWT: 0.42  Fractional short: 34 %  Ejection Fraction (Teicholtz): 64 %  ------------------------------------------------------------------------  OBSERVATIONS:  Mitral Valve: Mitral annular calcification, otherwise  normal mitral valve. Mild mitral regurgitation.  Aortic Root: Normal aortic root.  Aortic Valve: Calcified trileaflet aortic valve with normal  opening. Mild-moderate aortic regurgitation.  Left Atrium: Normal left atrium.  LA volume index = 24  cc/m2.  Left Ventricle: Normal left ventricular systolic function.  No segmental wall motion abnormalities. Normal left  ventricular internal dimensions and wall thicknesses.  Right Heart: Normal right atrium. The right ventricle is  not well visualized; grossly normal right ventricular  systolic function. Normal tricuspid valve.  Mild tricuspid  regurgitation. Normal pulmonic valve.  Pericardium/PleuraNormal pericardium with no pericardial  effusion.  ------------------------------------------------------------------------  CONCLUSIONS:  1. Mitral annular calcification, otherwise normal mitral  valve. Mild mitral regurgitation.  2. Calcified trileaflet aortic valve with normal opening.  Mild-moderate aortic regurgitation.  3. Normal left ventricular internal dimensions and wall  thicknesses.  4. Normal left ventricular systolic function. No segmental  wall motion abnormalities.  5. The right ventricle is not well visualized; grossly  normal right ventricular systolic function.  6. Normal tricuspid valve.  Mild tricuspid regurgitation.  ------------------------------------------------------------------------  Confirmed on  2019 - 14:00:20 by CORINA Leiva  ------------------------------------------------------------------------    < end of copied text >      < from: Nuclear Stress Test-Pharmacologic (19 @ 10:04) >    PATIENT: Ángel Garza  : 05/10/1934   AGE: 85 (M)   MR#: [id number]  STUDY DATE: 2019  LOCATION: [place] [address]  REF. PHYSICIAN(S): Cristian Tan MD / Edwar Wilkins MD  FELLOW: REBECCA Reddy PA  ------------------------------------------------------------------------  TYPE OF TEST: Stress Pharmacologic  INDICATION: Shortness of breath (R06.02)  ------------------------------------------------------------------------  HISTORY:  CARDIAC HISTORY: 85 year old man with past medical history  of CAD (s/p CABG, PCI), HTN, HLD, DM2 arrived to hospital  with cough and shortness of breath  OTHER HISTORY: prior PE, Melanoma (s/p keytruda), Colon Ca  (s/p partial colectomy)  RISK FACTORS: Past smoker, Diabetes, Hypertension, Family  History, Elevated Cholesterol  MEDICATIONS: ASA, Zithromax, Predinisone, Lipitor,  Symbicort, Imdur, Xopenex, Synthroid, Toprol, Flomax,  Spiriva, Remeron  ------------------------------------------------------------------------  BASELINE ELECTROCARDIOGRAM:  Rhythm: Normal Sinus Rhythm - 88 BPM  ST: No significant ST abnormalities.  ------------------------------------------------------------------------  HEMODYNAMIC PARAMETERS:                                      HR      BP  Baseline  Pre-Injection    64  100/52  00:00     Inject Regadenoson        67  00:30     Post Injection            66  01:00     Post Injection            81  106/63  02:00     Post Injection            88  03:00     Post Injection            87   88/49  04:00     Post Injection            88   83/48  05:00     Recovery                  86   89/44  06:00     Recovery                  86  100/54  07:12     Recovery                  82  ------------------------------------------------------------------------  Agent: Regadenoson 0.4 mg/5 ml NS. injected over 10 sec.  HR: Baseline HR: 64 bpm   Peak HR: 88 bpm (65% of MPHR)  MPHR: 135 bpm   85% of MPHR: 115 bpm  BP: Baseline BP: 100/52 mmHg   Peak BP: 106/63 mmHg   Peak  RPP: 9328 (Rate Pressure Product)  Last Caffeine intake: 12 hrs  Terminated: Completion of protocol  ------------------------------------------------------------------------  SYMPTOMS/FINDINGS:  Symptoms: Shortness of breath  Chest pain: No chest pain with administration of  Regadenoson  Treatment: None  ------------------------------------------------------------------------  ECG ABNORMALITIES DURING/AFTER STRESS:   Abnormalities: None  ------------------------------------------------------------------------  NEW ARRHYTHMIAS DEVELOPED DURING/AFTER STRESS:  Single APD occurred during recovery. Single VPD occurred  during stress.  ------------------------------------------------------------------------  STRESS TEST IMPRESSIONS:  Chest Pain: No chest pain with administration of  Regadenoson.  Symptom: Shortness of breath.  HR Response: Appropriate.  BP Response: Appropriate.  Heart Rhythm: Normal Sinus Rhythm - 88 BPM.  Baseline ECG: No significant ST abnormalities.  ECG Abnormalities: None.  Arrhythmia: Single APD occurred during recovery. Single  VPD occurred during stress.  ------------------------------------------------------------------------  PROCEDURE:  7.31 mCi of Tc99m Sestamibi were injected during stress  protocol. Approximately 45 minutes later, tomographic  images were obtained in a 180 degree arc from right  anterior oblique to left anterior oblique with 64 stops.  The tomographic slices were reconstructed in 3 orthogonal  planes (short axis, horizontal long axis and vertical long  axis).  Interpretation was performed both by visual and  quantitative analysis.  Stress images were acquired using CZT-based system with  pinhole collimation ([employer], [employer]),  and reconstructed using MLEM algorithm. Images were  re-acquired with the patient in a prone position.  ------------------------------------------------------------------------  NUCLEAR FINDINGS:  Review of raw data shows: The study is of good technical  quality.  There is a small, mild defect in basal inferior wall that  corrects with prone imaging, suggestive of attenuation  artifact.  ------------------------------------------------------------------------  GATED ANALYSIS:  Post-stress gated wall motion analysis was performed (LVEF  = 67 %;LVEDV = 78 ml.), revealing normal LV function. RV  function appeared normal.  ------------------------------------------------------------------------  IMPRESSIONS:Normal Study  * Myocardial Perfusion SPECT results are normal.  * Review of raw data shows: The study is of good technical  quality.  * There is a small, mild defect in basal inferior wall  that corrects with prone imaging, suggestive of  attenuation artifact.  * Post-stress gated wall motion analysis was performed  (LVEF = 67 %;LVEDV = 78 ml.), revealing normal LV  function. RVfunction appeared normal.  * No clear evidence of ischemia.  ------------------------------------------------------------------------  Confirmed on  2019 - 16:20:07 by CORINA Leiva  ------------------------------------------------------------------------    < end of copied text > Cardiology/Vascular Medicine Inpatient Progress Note    No new complaints  Telemetry: SR, PACs, PVCs. No episodes of AFib noted on telemetry since  AM    With CHADSVASC 6, recommend anticoagulation  Receiving heparin gtt challenge  If no evidence of GIB, can restart DOAC    Vital Signs Last 24 Hrs  T(C): 36.6 (2019 05:14), Max: 36.7 (2019 13:07)  T(F): 97.9 (2019 05:14), Max: 98.1 (2019 21:49)  HR: 62 (2019 05:14) (62 - 78)  BP: 116/58 (2019 05:14) (95/52 - 128/63)  BP(mean): --  RR: 18 (2019 05:14) (18 - 18)  SpO2: 97% (2019 05:14) (96% - 97%)    Appearance: NAD, laying flat in bed  HEENT:   Normal oral mucosa, PERRL, EOMI	  Cardiovascular: Normal S1 S2, No JVD, 2-3/6 SM  Respiratory: Coarse breath sounds bilaterally  Psychiatry: Awake, alert  Gastrointestinal:  Soft, Non-tender, + BS	  Skin: No rashes, No ecchymoses, No cyanosis	  Neurologic: Non-focal  Extremities: Normal range of motion, No clubbing, cyanosis or edema  Vascular: Peripheral pulses palpable 2+ bilaterally    MEDICATIONS  (STANDING):  amitriptyline 25 milliGRAM(s) Oral at bedtime  aspirin enteric coated 81 milliGRAM(s) Oral daily  atorvastatin 40 milliGRAM(s) Oral at bedtime  buDESOnide 160 MICROgram(s)/formoterol 4.5 MICROgram(s) Inhaler 2 Puff(s) Inhalation two times a day  dextrose 5%. 1000 milliLiter(s) (50 mL/Hr) IV Continuous <Continuous>  dextrose 50% Injectable 12.5 Gram(s) IV Push once  dextrose 50% Injectable 25 Gram(s) IV Push once  dextrose 50% Injectable 25 Gram(s) IV Push once  docusate sodium 100 milliGRAM(s) Oral three times a day  enoxaparin Injectable 40 milliGRAM(s) SubCutaneous every 24 hours  heparin  Infusion.  Unit(s)/Hr (12 mL/Hr) IV Continuous <Continuous>  insulin lispro (HumaLOG) corrective regimen sliding scale   SubCutaneous three times a day before meals  insulin lispro (HumaLOG) corrective regimen sliding scale   SubCutaneous at bedtime  isosorbide   mononitrate ER Tablet (IMDUR) 60 milliGRAM(s) Oral daily  levothyroxine 112 MICROGram(s) Oral daily  metoprolol succinate ER 50 milliGRAM(s) Oral daily  mirtazapine 15 milliGRAM(s) Oral at bedtime  polyethylene glycol 3350 17 Gram(s) Oral daily  prednisoLONE acetate 1% Suspension 1 Drop(s) Right EYE four times a day  predniSONE   Tablet 40 milliGRAM(s) Oral daily  senna 2 Tablet(s) Oral at bedtime  tamsulosin 0.4 milliGRAM(s) Oral two times a day  tiotropium 18 MICROgram(s) Capsule 1 Capsule(s) Inhalation daily    MEDICATIONS  (PRN):  acetaminophen   Tablet .. 650 milliGRAM(s) Oral every 6 hours PRN Mild Pain (1 - 3), Moderate Pain (4 - 6)  benzonatate 100 milliGRAM(s) Oral every 8 hours PRN Cough  dextrose 40% Gel 15 Gram(s) Oral once PRN Blood Glucose LESS THAN 70 milliGRAM(s)/deciliter  glucagon  Injectable 1 milliGRAM(s) IntraMuscular once PRN Glucose LESS THAN 70 milligrams/deciliter  heparin  Injectable 5500 Unit(s) IV Push every 6 hours PRN For aPTT less than 40  heparin  Injectable 2500 Unit(s) IV Push every 6 hours PRN For aPTT between 40 - 57      LABS:	 	                                 11.3   8.02  )-----------( 188      ( 2019 04:23 )             34.8         141  |  107  |  26<H>  ----------------------------<  105<H>  3.7   |  22  |  0.92    Ca    8.7      2019 04:23  Phos  3.7       Mg     2.1         PTT - ( 2019 04:23 )  PTT:83.7 SEC      < from: Cardiac Cath Lab - Adult (17 @ 14:14) >  [facility]  Department of Cardiology  [address] [place] [id number]  [phone]  Cath Lab Report -- Comprehensive Report  Patient: ÁNGEL GARZA  Study date: 2017  Account number: [id number]  MR number:[id number]  : 05/10/1934  Gender: Male  Race: O  Case Physician(s):  Shanti Bowie M.D.  Referring Physician:  INDICATIONS: Unstable angina - CCS4.  HISTORY: The patient has a history of coronary artery disease. The patient  has hypertension, medication-treated dyslipidemia, low HDL, and a family  history of coronary artery disease.  PROCEDURE:  --  Left heart catheterization with ventriculography.  --  Left coronary angiography.  --  Right coronary angiography.  --  Bypass graft angiography.  TECHNIQUE: The risks and alternatives of the procedures and conscious  sedation were explained to the patient and informed consent was obtained.  Cardiac catheterization performed electively.  Local anesthetic given. Right femoral artery access. Localanesthetic  given. A 5FR SHEATH PINNACLE was inserted in the vessel, utilizing the  modified Seldinger technique. Left heart catheterization. Ventriculography  was performed. A 5FR FR4.0 EXPO catheter was utilized. After recording  ascending aortic pressure, the catheter was advanced across the aortic  valve and left ventricular pressure was recorded. Imaging was performed  using an CAVAZOS projection. Post-ventriculography LV pressure was obtained.  The catheter was gradually withdrawn into the aorta under continuous  pressure monitoring and aortic pressure was recorded. Left coronary artery  angiography. The vessel was injected utilizing a 5FR FL4.0 EXPO catheter  and positioned in the vessel ostia under fluoroscopic guidance.  Angiography was performed in multiple projections using hand-injection of  contrast. Right coronary artery angiography. The vessel was injected  utilizing a 5FR FR4.0 EXPO catheter and positioned in the vessel ostia  under fluoroscopic guidance. Angiography was performed in multiple  projections using hand-injection of contrast. Graft angiography. The  vessel was injected utilizing a 5FR FR4.0 EXPO catheter and positioned in  the vessel ostia under fluoroscopic guidance. Angiography was performed in  multiple projections using hand-injection of contrast. RADIATION EXPOSURE:  5.5 min.  CONTRAST GIVEN: Omnipaque 28 ml.  VENTRICLES: EF estimated was 55 %.  CORONARY VESSELS: The coronary circulation is left dominant.  LM:   --  LM: Normal.  LAD:   --  Proximal LAD: There was a 100 % stenosis.  --  D1: There was a tubular 25 % stenosis at the site of a prior  angioplasty with stent placement.  CX:   --  LPDA: There was a 100 % stenosis.  RCA:   --  Mid RCA: There was a 100 % stenosis.  GRAFTS:   --  Graft to the mid LAD: The graft was a normal sized LIMA.  Distal vessel angiography showed mild diffuse disease.  --  Graft to the LPDA: The graft was a normal sized saphenous vein graft  from the aorta. Graft angiography showed mild atherosclerosis. Distal  vessel angiography showed mild diffuse disease.  COMPLICATIONS: There were no complications.  DIAGNOSTIC RECOMMENDATIONS: Medical management is recommended.  Prepared and signed by  Shanti Bowie M.D.  Signed 2017 18:16:49  HEMODYNAMIC TABLES  Pressures:  Baseline/ Room Air  Pressures:  - HR: 69  Pressures:  - Rhythm:  Pressures:  -- Aortic Pressure (S/D/M): 113/64/84  Pressures:  -- Left Ventricle (s/edp): 118/12/--  Outputs:  Baseline/ Room Air  Outputs:  -- CALCULATIONS[age]  Outputs:  -- CALCULATIONS: Body Surface Area: 1.78  Outputs:  -- CALCULATIONS: Height in cm: 167.00  Outputs:  -- CALCULATIONS: Sex: Male  Outputs:  -- CALCULATIONS: Weight in k.70    < end of copied text >      < from: CT Angio Chest w/ IV Cont (19 @ 21:39) >  EXAM:  CT ANGIO CHEST (W)AW IC        PROCEDURE DATE:  2019         INTERPRETATION:  CLINICAL INFORMATION: Dyspnea. Evaluate for pulmonary   embolus    COMPARISON: None.    PROCEDURE:   CT Angiography of the Chest.  90 ml of Omnipaque 350 was injected intravenously. 10 ml were discarded.  Sagittal and coronal reformats were performed as well as 3D (MIP)   reconstructions.      FINDINGS:    LUNGS AND AIRWAYS: Patent central airways.  Punctate left apical nodular   opacities likely distal mucus impaction. Bilateral parenchymal scarring.    PLEURA: No pleural effusion.    MEDIASTINUM AND CECY: No lymphadenopathy.    VESSELS: Poor bolus opacification and respiratory motion limits   evaluation for pulmonary embolus. No central or lobar pulmonary embolus.   Evaluation of the segmental and subsegmental pulmonary arteries is   nondiagnostic. Normal left-sided 3 vessel aortic arch.    HEART: Heart size is normal. No pericardial effusion.    CHEST WALL AND LOWER NECK: Status post midline sternotomy    VISUALIZED UPPER ABDOMEN: 2 cm heterogeneous left adrenal nodule. The   gallbladder is surgically absent. 1.7 cm indeterminate left renal lesion.    BONES: There is multilevel thoracic spondylosis.    IMPRESSION:     No central or lobar acute pulmonary arterial embolus. Nondiagnostic study   for acute pulmonary arterial embolus of the segmental and subsegmental   pulmonary arteries.    Indeterminate 2 cm left adrenal lesion.    1.7 cm indeterminate left renal lesion.      CR PENN M.D., RADIOLOGY RESIDENT  This document has been electronically signed.  AMERICA BULL M.D., ATTENDING RADIOLOGIST  This document has been electronically signed. 2019 12:10AM      < from: Transthoracic Echocardiogram (19 @ 09:52) >    Patient name: Ángel Garza  YOB: 1934   Age: 85 (M)   MR#: [id number]  Study Date: 2019  Location: [facility] Sonographer: Lauren Finkelstein, RDCS  Study quality: Technically good  Referring Physician: Edwar Wilkins MD  Blood Pressure: 102/61 mmHg  Height: 167 cm  Weight: 67 kg  BSA: 1.8 m2  ------------------------------------------------------------------------  PROCEDURE: Transthoracic echocardiogram with 2-D, M-Mode  and complete spectral and color flow Doppler.  INDICATION: Abnormal electrocardiogram (ECG) (EKG)  (R94.31), Shortness of breath (R06.02)  ------------------------------------------------------------------------  DIMENSIONS:  Dimensions:     Normal Values:  LA:     4.3 cm    2.0 - 4.0 cm  Ao:     3.0 cm    2.0- 3.8 cm  SEPTUM: 0.8 cm    0.6 - 1.2 cm  PWT:    0.8 cm    0.6 - 1.1 cm  LVIDd:  3.8 cm    3.0 - 5.6 cm  LVIDs:  2.5 cm    1.8 - 4.0 cm  Derived Variables:  LVMI: 49 g/m2  RWT: 0.42  Fractional short: 34 %  Ejection Fraction (Teicholtz): 64 %  ------------------------------------------------------------------------  OBSERVATIONS:  Mitral Valve: Mitral annular calcification, otherwise  normal mitral valve. Mild mitral regurgitation.  Aortic Root: Normal aortic root.  Aortic Valve: Calcified trileaflet aortic valve with normal  opening. Mild-moderate aortic regurgitation.  Left Atrium: Normal left atrium.  LA volume index = 24  cc/m2.  Left Ventricle: Normal left ventricular systolic function.  No segmental wall motion abnormalities. Normal left  ventricular internal dimensions and wall thicknesses.  Right Heart: Normal right atrium. The right ventricle is  not well visualized; grossly normal right ventricular  systolic function. Normal tricuspid valve.  Mild tricuspid  regurgitation. Normal pulmonic valve.  Pericardium/PleuraNormal pericardium with no pericardial  effusion.  ------------------------------------------------------------------------  CONCLUSIONS:  1. Mitral annular calcification, otherwise normal mitral  valve. Mild mitral regurgitation.  2. Calcified trileaflet aortic valve with normal opening.  Mild-moderate aortic regurgitation.  3. Normal left ventricular internal dimensions and wall  thicknesses.  4. Normal left ventricular systolic function. No segmental  wall motion abnormalities.  5. The right ventricle is not well visualized; grossly  normal right ventricular systolic function.  6. Normal tricuspid valve.  Mild tricuspid regurgitation.  ------------------------------------------------------------------------  Confirmed on  2019 - 14:00:20 by CORINA Leiva  ------------------------------------------------------------------------    < end of copied text >      < from: Nuclear Stress Test-Pharmacologic (19 @ 10:04) >    PATIENT: Ángel Garza  : 05/10/1934   AGE: 85 (M)   MR#: [id number]  STUDY DATE: 2019  LOCATION: [place] [address]  REF. PHYSICIAN(S): Cristian Tan MD / Edwar Wilkins MD  FELLOW: REBECCA Reddy PA  ------------------------------------------------------------------------  TYPE OF TEST: Stress Pharmacologic  INDICATION: Shortness of breath (R06.02)  ------------------------------------------------------------------------  HISTORY:  CARDIAC HISTORY: 85 year old man with past medical history  of CAD (s/p CABG, PCI), HTN, HLD, DM2 arrived to hospital  with cough and shortness of breath  OTHER HISTORY: prior PE, Melanoma (s/p keytruda), Colon Ca  (s/p partial colectomy)  RISK FACTORS: Past smoker, Diabetes, Hypertension, Family  History, Elevated Cholesterol  MEDICATIONS: ASA, Zithromax, Predinisone, Lipitor,  Symbicort, Imdur, Xopenex, Synthroid, Toprol, Flomax,  Spiriva, Remeron  ------------------------------------------------------------------------  BASELINE ELECTROCARDIOGRAM:  Rhythm: Normal Sinus Rhythm - 88 BPM  ST: No significant ST abnormalities.  ------------------------------------------------------------------------  HEMODYNAMIC PARAMETERS:                                      HR      BP  Baseline  Pre-Injection    64  100/52  00:00     Inject Regadenoson        67  00:30     Post Injection            66  01:00     Post Injection            81  106/63  02:00     Post Injection            88  03:00     Post Injection            87   88/49  04:00     Post Injection            88   83/48  05:00     Recovery                  86   89/44  06:00     Recovery                  86  100/54  07:12     Recovery                  82  ------------------------------------------------------------------------  Agent: Regadenoson 0.4 mg/5 ml NS. injected over 10 sec.  HR: Baseline HR: 64 bpm   Peak HR: 88 bpm (65% of MPHR)  MPHR: 135 bpm   85% of MPHR: 115 bpm  BP: Baseline BP: 100/52 mmHg   Peak BP: 106/63 mmHg   Peak  RPP: 9328 (Rate Pressure Product)  Last Caffeine intake: 12 hrs  Terminated: Completion of protocol  ------------------------------------------------------------------------  SYMPTOMS/FINDINGS:  Symptoms: Shortness of breath  Chest pain: No chest pain with administration of  Regadenoson  Treatment: None  ------------------------------------------------------------------------  ECG ABNORMALITIES DURING/AFTER STRESS:   Abnormalities: None  ------------------------------------------------------------------------  NEW ARRHYTHMIAS DEVELOPED DURING/AFTER STRESS:  Single APD occurred during recovery. Single VPD occurred  during stress.  ------------------------------------------------------------------------  STRESS TEST IMPRESSIONS:  Chest Pain: No chest pain with administration of  Regadenoson.  Symptom: Shortness of breath.  HR Response: Appropriate.  BP Response: Appropriate.  Heart Rhythm: Normal Sinus Rhythm - 88 BPM.  Baseline ECG: No significant ST abnormalities.  ECG Abnormalities: None.  Arrhythmia: Single APD occurred during recovery. Single  VPD occurred during stress.  ------------------------------------------------------------------------  PROCEDURE:  7.31 mCi of Tc99m Sestamibi were injected during stress  protocol. Approximately 45 minutes later, tomographic  images were obtained in a 180 degree arc from right  anterior oblique to left anterior oblique with 64 stops.  The tomographic slices were reconstructed in 3 orthogonal  planes (short axis, horizontal long axis and vertical long  axis).  Interpretation was performed both by visual and  quantitative analysis.  Stress images were acquired using CZT-based system with  pinhole collimation ([employer], [employer]),  and reconstructed using MLEM algorithm. Images were  re-acquired with the patient in a prone position.  ------------------------------------------------------------------------  NUCLEAR FINDINGS:  Review of raw data shows: The study is of good technical  quality.  There is a small, mild defect in basal inferior wall that  corrects with prone imaging, suggestive of attenuation  artifact.  ------------------------------------------------------------------------  GATED ANALYSIS:  Post-stress gated wall motion analysis was performed (LVEF  = 67 %;LVEDV = 78 ml.), revealing normal LV function. RV  function appeared normal.  ------------------------------------------------------------------------  IMPRESSIONS:Normal Study  * Myocardial Perfusion SPECT results are normal.  * Review of raw data shows: The study is of good technical  quality.  * There is a small, mild defect in basal inferior wall  that corrects with prone imaging, suggestive of  attenuation artifact.  * Post-stress gated wall motion analysis was performed  (LVEF = 67 %;LVEDV = 78 ml.), revealing normal LV  function. RVfunction appeared normal.  * No clear evidence of ischemia.  ------------------------------------------------------------------------  Confirmed on  2019 - 16:20:07 by Destinee Shelton M.D. RPVI  ------------------------------------------------------------------------    < end of copied text >

## 2019-06-23 NOTE — PROGRESS NOTE ADULT - ASSESSMENT
Patient is an 85 year old man with HTN, DM2 not on insulin, Colon Ca s/p partial colectomy (3/2019)  Melanoma s/p keytruda, PE s/p Xarelto (reported to have stopped 3 months ago)  hyperlipidemia, COPD not on home O2, CAD s/p Stent and CABG.  As per family, patient has a history of AFib, stopped taking anticoagulation because of history of GIB.    Stress test 6/21 -- no evidence of ischemia  Noted to have atrial fibrillation with rapid ventricular response on telemetry (with ventricular rates to 130s bpm) on morning of 6/21.  Currently in SR  With CHADSVASC 6, recommend anticoagulation   Receiving heparin gtt challenge  Monitoring Hgb/Hct  If no issues with GIB, recommend starting DOAC

## 2019-06-24 ENCOUNTER — TRANSCRIPTION ENCOUNTER (OUTPATIENT)
Age: 84
End: 2019-06-24

## 2019-06-24 VITALS
OXYGEN SATURATION: 100 % | DIASTOLIC BLOOD PRESSURE: 59 MMHG | TEMPERATURE: 98 F | SYSTOLIC BLOOD PRESSURE: 114 MMHG | HEART RATE: 71 BPM

## 2019-06-24 LAB
ANION GAP SERPL CALC-SCNC: 10 MMO/L — SIGNIFICANT CHANGE UP (ref 7–14)
APTT BLD: 29.1 SEC — SIGNIFICANT CHANGE UP (ref 27.5–36.3)
BUN SERPL-MCNC: 30 MG/DL — HIGH (ref 7–23)
CALCIUM SERPL-MCNC: 9 MG/DL — SIGNIFICANT CHANGE UP (ref 8.4–10.5)
CHLORIDE SERPL-SCNC: 109 MMOL/L — HIGH (ref 98–107)
CO2 SERPL-SCNC: 23 MMOL/L — SIGNIFICANT CHANGE UP (ref 22–31)
CREAT SERPL-MCNC: 0.97 MG/DL — SIGNIFICANT CHANGE UP (ref 0.5–1.3)
GLUCOSE BLDC GLUCOMTR-MCNC: 143 MG/DL — HIGH (ref 70–99)
GLUCOSE SERPL-MCNC: 89 MG/DL — SIGNIFICANT CHANGE UP (ref 70–99)
HCT VFR BLD CALC: 34.4 % — LOW (ref 39–50)
HGB BLD-MCNC: 11.4 G/DL — LOW (ref 13–17)
MAGNESIUM SERPL-MCNC: 2.1 MG/DL — SIGNIFICANT CHANGE UP (ref 1.6–2.6)
MCHC RBC-ENTMCNC: 28.9 PG — SIGNIFICANT CHANGE UP (ref 27–34)
MCHC RBC-ENTMCNC: 33.1 % — SIGNIFICANT CHANGE UP (ref 32–36)
MCV RBC AUTO: 87.3 FL — SIGNIFICANT CHANGE UP (ref 80–100)
NRBC # FLD: 0 K/UL — SIGNIFICANT CHANGE UP (ref 0–0)
PHOSPHATE SERPL-MCNC: 4.1 MG/DL — SIGNIFICANT CHANGE UP (ref 2.5–4.5)
PLATELET # BLD AUTO: 202 K/UL — SIGNIFICANT CHANGE UP (ref 150–400)
PMV BLD: 10 FL — SIGNIFICANT CHANGE UP (ref 7–13)
POTASSIUM SERPL-MCNC: 3.5 MMOL/L — SIGNIFICANT CHANGE UP (ref 3.5–5.3)
POTASSIUM SERPL-SCNC: 3.5 MMOL/L — SIGNIFICANT CHANGE UP (ref 3.5–5.3)
RBC # BLD: 3.94 M/UL — LOW (ref 4.2–5.8)
RBC # FLD: 14.2 % — SIGNIFICANT CHANGE UP (ref 10.3–14.5)
SODIUM SERPL-SCNC: 142 MMOL/L — SIGNIFICANT CHANGE UP (ref 135–145)
WBC # BLD: 6.55 K/UL — SIGNIFICANT CHANGE UP (ref 3.8–10.5)
WBC # FLD AUTO: 6.55 K/UL — SIGNIFICANT CHANGE UP (ref 3.8–10.5)

## 2019-06-24 PROCEDURE — 99232 SBSQ HOSP IP/OBS MODERATE 35: CPT

## 2019-06-24 PROCEDURE — 99239 HOSP IP/OBS DSCHRG MGMT >30: CPT

## 2019-06-24 RX ORDER — BUDESONIDE AND FORMOTEROL FUMARATE DIHYDRATE 160; 4.5 UG/1; UG/1
2 AEROSOL RESPIRATORY (INHALATION)
Qty: 1 | Refills: 0
Start: 2019-06-24 | End: 2019-07-23

## 2019-06-24 RX ORDER — RIVAROXABAN 15 MG-20MG
1 KIT ORAL
Qty: 30 | Refills: 0
Start: 2019-06-24 | End: 2019-07-23

## 2019-06-24 RX ORDER — FLUTICASONE PROPIONATE AND SALMETEROL 50; 250 UG/1; UG/1
1 POWDER ORAL; RESPIRATORY (INHALATION)
Qty: 0 | Refills: 0 | DISCHARGE

## 2019-06-24 RX ADMIN — Medication 81 MILLIGRAM(S): at 12:45

## 2019-06-24 RX ADMIN — Medication 40 MILLIGRAM(S): at 05:59

## 2019-06-24 RX ADMIN — Medication 1 DROP(S): at 02:03

## 2019-06-24 RX ADMIN — BUDESONIDE AND FORMOTEROL FUMARATE DIHYDRATE 2 PUFF(S): 160; 4.5 AEROSOL RESPIRATORY (INHALATION) at 08:19

## 2019-06-24 RX ADMIN — Medication 112 MICROGRAM(S): at 05:59

## 2019-06-24 RX ADMIN — Medication 100 MILLIGRAM(S): at 05:59

## 2019-06-24 RX ADMIN — ISOSORBIDE MONONITRATE 60 MILLIGRAM(S): 60 TABLET, EXTENDED RELEASE ORAL at 12:43

## 2019-06-24 RX ADMIN — TAMSULOSIN HYDROCHLORIDE 0.4 MILLIGRAM(S): 0.4 CAPSULE ORAL at 05:59

## 2019-06-24 RX ADMIN — TIOTROPIUM BROMIDE 1 CAPSULE(S): 18 CAPSULE ORAL; RESPIRATORY (INHALATION) at 09:53

## 2019-06-24 RX ADMIN — Medication 1 DROP(S): at 08:18

## 2019-06-24 RX ADMIN — Medication 50 MILLIGRAM(S): at 05:59

## 2019-06-24 NOTE — DISCHARGE NOTE NURSING/CASE MANAGEMENT/SOCIAL WORK - NSDCDPATPORTLINK_GEN_ALL_CORE
You can access the [facility] Patient Portal, offered by [facility], by registering with the following website: [email or web address]

## 2019-06-24 NOTE — PROGRESS NOTE ADULT - PROBLEM SELECTOR PLAN 3
Incidental finding of 2 cm adrenal lesion noted. Will need outpatient monitoring.

## 2019-06-24 NOTE — PROGRESS NOTE ADULT - PROBLEM SELECTOR PLAN 1
Admitted to medicine for suspected COPD exacerbation. Patient presented with acute hypoxic respiratory failure (O2 sat 77% on ED triage vitals). O2 sat now normal. Patient states SOB/cp have resolved.     Also noted to have a tachycardic event  up to 159bpm, possibly Afib vs ST c PAC's     CTPA completed and without PE though was non-diagnostic  for segmental and subsegmental PE.     BNP was 945.2 - patient does not appear grossly volume overloaded at this time    Will c/w oral prednisone for 5 day course, azithromycin 500mg x3 days to treat potential COPD exacerbation
Admitted to medicine for suspected COPD exacerbation. Patient presented with acute hypoxic respiratory failure (O2 sat 77% on ED triage vitals). O2 sat now normal. Patient states SOB/cp have resolved.     Also noted to have a tachycardic event  up to 159bpm, possibly Afib vs ST c PAC's   CTPA completed and without PE though was non-diagnostic  for segmental and subsegmental PE.   RVP was negative  BNP was 945.2 - patient does not appear grossly volume overloaded at this time  Will c/w oral prednisone for 5 day course, azithromycin 500mg x3 days to treat potential COPD exacerbation

## 2019-06-24 NOTE — DISCHARGE NOTE PROVIDER - HOSPITAL COURSE
86 y/o Greenlandic speaking male pmh of HTN, DM Type II not on insulin, Colon Ca s/p partial colectomy (3/2019)  Melanoma s/p keytruda, PE s/p Xarelto (reported to have stopped 3-6 months ago due to epistaxis) HTN, HLD, COPD not on home O2, CAD s/p Stent and CABG presents with cough and inability to catch breath admitted for possible COPD exacerbation noted to have tachycardic event.        1. Atrial fibrillation - cards c/s- Stress test done 6/21 showed no evidence of ischemia. Noted to have atrial fibrillation with rapid ventricular response on telemetry (with ventricular rates to 130s bpm) on morning of 6/21. Pt is currently in SR. With CHADSVASC 6, anticoagulation is recommended. Pt used to be on Rivaroxaban in the past for a PE so the same dose was restarted. Pt will go home on Rivaroxaban 15mg every day. Pts hemoglobin was stable today 6/4 at 11.4.     6/20 TTE: Mild-moderate aortic regurgitation. EF 64%        2. COPD exacerbation- Pt was put on Solumedrol 40 q12 x24 hours then was on 40mg PO taper for 5 days. Pt completed steroid course today 6/4. Pt was also given Azithromycin 500 qD x 3 days. CTPA was negative for PE. Pt was put on Xopenex but was discontinued for bradycardia. RVP was negative. .2. Pt will follow up with Pulmonologist Dr. Garcia on 6/28/2019 at 2PM.         3. 2cm Adrenal lesion on CT abdomen - Pt will get an outpatient w/u         Case discussed with Dr. Wilkins and Dr. Truong, pt medically stable for discharge home. 86 y/o Azerbaijani speaking male pmh of HTN, DM Type II not on insulin, Colon Ca s/p partial colectomy (3/2019)  Melanoma s/p keytruda, PE s/p Xarelto (reported to have stopped 3-6 months ago due to epistaxis) HTN, HLD, COPD not on home O2, CAD s/p Stent and CABG presents with cough and inability to catch breath admitted for possible COPD exacerbation noted to have tachycardic event.        1. Atrial fibrillation - cards c/s- Stress test done 6/21 showed no evidence of ischemia. Noted to have atrial fibrillation with rapid ventricular response on telemetry (with ventricular rates to 130s bpm) on morning of 6/21. Pt is currently in SR. With CHADSVASC 6, anticoagulation is recommended. Pt used to be on Rivaroxaban in the past for a PE so the same dose was restarted. Pt will go home on Rivaroxaban 15mg every day. Pts hemoglobin was stable today 6/4 at 11.4.     6/20 TTE: Mild-moderate aortic regurgitation. EF 64%        2. COPD exacerbation- Pt was put on Solumedrol 40 q12 x24 hours then was on 40mg PO taper for 5 days. Pt completed steroid course today 6/4. Pt was also given Azithromycin 500 qD x 3 days. CTPA was negative for PE. Pt was put on Xopenex but was discontinued for bradycardia. RVP was negative. .2. Pt will follow up with Pulmonologist Dr. Garcia on 6/28/2019 at 2PM.         3. 2cm Adrenal lesion on CT abdomen - Pt will get an outpatient w/u     Discussed with pts daughter Cate - Updated need for outpt monitoring    I also spoke with pts PMD Dr Bacon- PMD to FU as outpt

## 2019-06-24 NOTE — DISCHARGE NOTE PROVIDER - NSDCCPCAREPLAN_GEN_ALL_CORE_FT
PRINCIPAL DISCHARGE DIAGNOSIS  Diagnosis: Shortness of breath  Assessment and Plan of Treatment: You were admitted for a COPD exacerbation. You were given IV steroids and then tapered down to oral steroids. You completed your 5th day of steroids today and do not need to take steroids anymore. You were on Xopenex for your shortness of breath but your heart rate was low so you will no longer take Xopenex. This medication can lower your heart rate. You had a CT done that was negative for a blood clot in your lung. Please follow up with Pulmonologist Dr. Garcia on 6/28/2019 at 2PM as scheduled.      SECONDARY DISCHARGE DIAGNOSES  Diagnosis: Atrial fibrillation  Assessment and Plan of Treatment: You were monitored on telemetry and found to have atrial fibrillation, which is an irregular rhythm of the heart. You will be taking Metoprolol 50mg daily to control your heart rate and you will be taking Xarelto 15mg (blood thinner) to prevent blood clots. This is the same blood thinner and same dose that you took in the past. Please follow up with Dr. Wilkins in 1 week.    Diagnosis: Adrenal mass, left  Assessment and Plan of Treatment: You had a cm adrenal lesion found on CT. Please follow up with Endocrinology as outpatient in 1-2 weeks.

## 2019-06-24 NOTE — DISCHARGE NOTE PROVIDER - NSDCFUADDAPPT_GEN_ALL_CORE_FT
Please follow up with Pulmonologist Dr. Garcia on 6/28/2019 at 2PM as scheduled.  Please follow up with Dr. Wilkins in 1 week.  Please follow up with Endocrinology for an incidental finding of 2 cm adrenal lesion within 1-2 weeks.

## 2019-06-24 NOTE — PROGRESS NOTE ADULT - PROVIDER SPECIALTY LIST ADULT
Cardiology
Hospitalist
Vascular Cardiology

## 2019-06-24 NOTE — CHART NOTE - NSCHARTNOTEFT_GEN_A_CORE
Notified by Evelyne EL pt's BP was 91/55 mmHg   Patient asymptomatic  Hypotension - pt was given Normal saline 250 ccs IVF bolus repeat /45 mmHg   Patient asymp will continue to monitor     Vital Signs Last 24 Hrs  T(C): 36.6 (23 Jun 2019 21:22), Max: 36.6 (23 Jun 2019 05:14)  T(F): 97.9 (23 Jun 2019 21:22), Max: 97.9 (23 Jun 2019 05:14)  HR: 62 (24 Jun 2019 00:10) (61 - 76)  BP: 107/45 (24 Jun 2019 00:10) (91/55 - 116/58)  BP(mean): --  RR: 16 (23 Jun 2019 21:22) (15 - 18)  SpO2: 98% (23 Jun 2019 21:22) (97% - 99%)

## 2019-06-24 NOTE — DISCHARGE NOTE PROVIDER - NSFOLLOWUPCLINICS_GEN_ALL_ED_FT
[facility] Endocrinology  Endocrinology  [address] [place] [address]  Phone: [phone]  Fax:   Follow Up Time:

## 2019-06-24 NOTE — PROGRESS NOTE ADULT - PROBLEM SELECTOR PLAN 5
Continue home BP meds

## 2019-06-24 NOTE — PROGRESS NOTE ADULT - SUBJECTIVE AND OBJECTIVE BOX
Patient is a 85y old  Male who presents with a chief complaint of cough (23 Jun 2019 06:20)      SUBJECTIVE / OVERNIGHT EVENTS: patient seen and examined by bedside at 12.30 pm   No new complaints  Anxious to go home tele:   NSR in 60-70       MEDICATIONS  (STANDING):  amitriptyline 25 milliGRAM(s) Oral at bedtime  aspirin enteric coated 81 milliGRAM(s) Oral daily  atorvastatin 40 milliGRAM(s) Oral at bedtime  buDESOnide 160 MICROgram(s)/formoterol 4.5 MICROgram(s) Inhaler 2 Puff(s) Inhalation two times a day  dextrose 5%. 1000 milliLiter(s) (50 mL/Hr) IV Continuous <Continuous>  dextrose 50% Injectable 12.5 Gram(s) IV Push once  dextrose 50% Injectable 25 Gram(s) IV Push once  dextrose 50% Injectable 25 Gram(s) IV Push once  docusate sodium 100 milliGRAM(s) Oral three times a day  insulin lispro (HumaLOG) corrective regimen sliding scale   SubCutaneous three times a day before meals  insulin lispro (HumaLOG) corrective regimen sliding scale   SubCutaneous at bedtime  isosorbide   mononitrate ER Tablet (IMDUR) 60 milliGRAM(s) Oral daily  levothyroxine 112 MICROGram(s) Oral daily  metoprolol succinate ER 50 milliGRAM(s) Oral daily  mirtazapine 15 milliGRAM(s) Oral at bedtime  polyethylene glycol 3350 17 Gram(s) Oral daily  prednisoLONE acetate 1% Suspension 1 Drop(s) Right EYE four times a day  predniSONE   Tablet 40 milliGRAM(s) Oral daily  rivaroxaban 15 milliGRAM(s) Oral daily  senna 2 Tablet(s) Oral at bedtime  tamsulosin 0.4 milliGRAM(s) Oral two times a day  tiotropium 18 MICROgram(s) Capsule 1 Capsule(s) Inhalation daily    MEDICATIONS  (PRN):  acetaminophen   Tablet .. 650 milliGRAM(s) Oral every 6 hours PRN Mild Pain (1 - 3), Moderate Pain (4 - 6)  benzonatate 100 milliGRAM(s) Oral every 8 hours PRN Cough  dextrose 40% Gel 15 Gram(s) Oral once PRN Blood Glucose LESS THAN 70 milliGRAM(s)/deciliter  glucagon  Injectable 1 milliGRAM(s) IntraMuscular once PRN Glucose LESS THAN 70 milligrams/deciliter      Vital Signs Last 24 Hrs  T(C): 36.5 (24 Jun 2019 12:42), Max: 36.6 (23 Jun 2019 21:22)  T(F): 97.7 (24 Jun 2019 12:42), Max: 97.9 (23 Jun 2019 21:22)  HR: 71 (24 Jun 2019 12:42) (61 - 71)  BP: 114/59 (24 Jun 2019 12:42) (91/55 - 114/59)  BP(mean): --  RR: 18 (24 Jun 2019 05:57) (16 - 18)  SpO2: 100% (24 Jun 2019 12:42) (98% - 100%)      CAPILLARY BLOOD GLUCOSE      POCT Blood Glucose.: 143 mg/dL (24 Jun 2019 12:40)  POCT Blood Glucose.: 109 mg/dL (24 Jun 2019 08:48)  POCT Blood Glucose.: 110 mg/dL (23 Jun 2019 21:57)  POCT Blood Glucose.: 116 mg/dL (23 Jun 2019 17:51)    I&O's Summary    22 Jun 2019 07:01  -  23 Jun 2019 07:00  --------------------------------------------------------  IN: 288 mL / OUT: 400 mL / NET: -112 mL        PHYSICAL EXAM:  GENERAL: NAD, well-developed, lying in bed, pleasant  HEAD:  Atraumatic, Normocephalic, MMM  CHEST/LUNG: Clear to auscultation bilaterally; No wheeze  HEART: Regular rate and rhythm; No murmurs, rubs, or gallops  ABDOMEN: Soft, Nontender, Nondistended; Bowel sounds present  EXTREMITIES:  2+ Peripheral Pulses, No clubbing, cyanosis, or edema  PSYCH: AAOx3  NEUROLOGY: CN II-XII grossly intact, moving all extremities        LABS:                        11.3   8.02  )-----------( 188      ( 23 Jun 2019 04:23 )             34.8     06-23    141  |  107  |  26<H>  ----------------------------<  105<H>  3.7   |  22  |  0.92    Ca    8.7      23 Jun 2019 04:23  Phos  3.7     06-23  Mg     2.1     06-23      PTT - ( 23 Jun 2019 04:23 )  PTT:83.7 SEC          RADIOLOGY & ADDITIONAL TESTS:  < from: Nuclear Stress Test-Pharmacologic (06.21.19 @ 10:04) >  IMPRESSIONS:Normal Study  * Myocardial Perfusion SPECT results are normal.  * Review of raw data shows: The study is of good technical  quality.  * There is a small, mild defect in basal inferior wall  that corrects with prone imaging, suggestive of  attenuation artifact.  * Post-stress gated wall motion analysis was performed  (LVEF = 67 %;LVEDV = 78 ml.), revealing normal LV  function. RVfunction appeared normal.  * No clear evidence of ischemia.        Consultant(s) Notes Reviewed:  cardiology     Care Discussed with Consultants/Other Providers:

## 2019-06-24 NOTE — PROGRESS NOTE ADULT - PROBLEM SELECTOR PROBLEM 2
R/O Paroxysmal atrial fibrillation

## 2019-06-24 NOTE — PROGRESS NOTE ADULT - PROBLEM SELECTOR PLAN 4
A1c at goal. c/w ISS.

## 2019-06-24 NOTE — DISCHARGE NOTE PROVIDER - CARE PROVIDER_API CALL
Jon Garcia)  Medicine  Pulmonary Medicine  [address] [place] [address], [address]  [place] [address]  Phone: [phone]  Fax: [phone]  Follow Up Time:     Edwar Wilkins; PhD)  Cardiology; Internal Medicine; Vascular Medicine  [address] [place] [address], [place] [address]  Phone: [phone]  Fax: [phone]  Follow Up Time:

## 2019-06-24 NOTE — PROGRESS NOTE ADULT - PROBLEM SELECTOR PLAN 2
Patient had an episode of tachycardia with HR up to 159, EKG was performed and is Afib vs ST c PAC. TTE without WMA or EF reduction, but with complaints concerning for angina (chest pain/sob on climbing stairs or walking uphill).     Will d/c levalbuterol in event that's contributing to rapid a fib. c/w metoprolol.    Nuclear stress test pending.    CHADS2 of 3. No stroke history. Moderate risk of stroke. Patient with h/o atrial fibrillation per daughter, was on DOAC at the time, but discontinued 2/2 epistaxis. Would benefit from further a/c given stroke risk.
Patient had an episode of tachycardia with HR up to 159, EKG was performed and is Afib vs ST c PAC. TTE without WMA or EF reduction, but with complaints concerning for angina (chest pain/sob on climbing stairs or walking uphill).   Will d/c levalbuterol in event that's contributing to rapid a fib. c/w metoprolol.  Nuclear stress test normal   CHADS2 of 6  No stroke history. Moderate risk of stroke. Patient with h/o atrial fibrillation per daughter, was on DOAC at the time, but discontinued 2/2 epistaxis. Would benefit from further a/c given stroke risk.  cardiology recommend GI clearance and heparin drip challenge while in hospital , case d/w daughter, pt with no recent GI bleed    Was heparin drip,  stool for occult blood negative, transitioned to Xarelto
Patient had an episode of tachycardia with HR up to 159, EKG was performed and is Afib vs ST c PAC. TTE without WMA or EF reduction, but with complaints concerning for angina (chest pain/sob on climbing stairs or walking uphill).   Will d/c levalbuterol in event that's contributing to rapid a fib. c/w metoprolol.  Nuclear stress test normal   CHADS2 of 6  No stroke history. Moderate risk of stroke. Patient with h/o atrial fibrillation per daughter, was on DOAC at the time, but discontinued 2/2 epistaxis. Would benefit from further a/c given stroke risk.  cardiology recommend GI clearance and heparin drip challenge while in hospital , case d/w daughter, pt with no recent GI bleed    started heparin drip,  stool for occult blood negative,  ,  cardiology recommend DOAC, will check with pharmacy./insurance for coverage
Patient had an episode of tachycardia with HR up to 159, EKG was performed and is Afib vs ST c PAC. TTE without WMA or EF reduction, but with complaints concerning for angina (chest pain/sob on climbing stairs or walking uphill).   Will d/c levalbuterol in event that's contributing to rapid a fib. c/w metoprolol.  Nuclear stress test normal   CHADS2 of 6  No stroke history. Moderate risk of stroke. Patient with h/o atrial fibrillation per daughter, was on DOAC at the time, but discontinued 2/2 epistaxis. Would benefit from further a/c given stroke risk.  cardiology recommend GI clearance and heparin drip challenge while in hospital , case d/w daughter, pt with no recent GI bleed   will start heparin drip, will check stool for occult blood , if positive, will consult GI
Patient had an episode of tachycardia with HR up to 159, EKG was performed and is Afib vs ST c PAC. TTE without WMA or EF reduction, but with complaints concerning for angina (chest pain/sob on climbing stairs or walking uphill). Will obtain nuclear stress testing.    CHADS2 of 3. No stroke history. Moderate risk of stroke. Patient with h/o atrial fibrillation per daughter, was on DOAC at the time, but discontinued 2/2 epistaxis. Will defer anticoagulation at this time.

## 2019-06-24 NOTE — PROGRESS NOTE ADULT - SUBJECTIVE AND OBJECTIVE BOX
Cardiology/Vascular Medicine Inpatient Progress Note    No new complaints  Telemetry: SR, PACs, PVCs. No episodes of AFib noted on telemetry since  AM    With CHADSVASC 6, recommend anticoagulation  Started on rivaroxaban 15 mg daily yesterday    Vital Signs Last 24 Hrs  T(C): 36.4 (2019 05:57), Max: 36.6 (2019 21:22)  T(F): 97.6 (2019 05:57), Max: 97.9 (2019 21:22)  HR: 62 (2019 05:57) (61 - 76)  BP: 106/61 (2019 05:57) (91/55 - 107/45)  BP(mean): --  RR: 18 (2019 05:57) (15 - 18)  SpO2: 99% (2019 05:57) (98% - 99%)    Appearance: NAD, laying flat in bed  HEENT:   Normal oral mucosa, PERRL, EOMI	  Cardiovascular: Normal S1 S2, No JVD, 2-3/6 SM  Respiratory: Coarse breath sounds bilaterally  Psychiatry: Awake, alert  Gastrointestinal:  Soft, Non-tender, + BS	  Skin: No rashes, No ecchymoses, No cyanosis	  Neurologic: Non-focal  Extremities: Normal range of motion, No clubbing, cyanosis or edema  Vascular: Peripheral pulses palpable 2+ bilaterally    MEDICATIONS  (STANDING):  amitriptyline 25 milliGRAM(s) Oral at bedtime  aspirin enteric coated 81 milliGRAM(s) Oral daily  atorvastatin 40 milliGRAM(s) Oral at bedtime  buDESOnide 160 MICROgram(s)/formoterol 4.5 MICROgram(s) Inhaler 2 Puff(s) Inhalation two times a day  dextrose 5%. 1000 milliLiter(s) (50 mL/Hr) IV Continuous <Continuous>  dextrose 50% Injectable 12.5 Gram(s) IV Push once  dextrose 50% Injectable 25 Gram(s) IV Push once  dextrose 50% Injectable 25 Gram(s) IV Push once  docusate sodium 100 milliGRAM(s) Oral three times a day  insulin lispro (HumaLOG) corrective regimen sliding scale   SubCutaneous three times a day before meals  insulin lispro (HumaLOG) corrective regimen sliding scale   SubCutaneous at bedtime  isosorbide   mononitrate ER Tablet (IMDUR) 60 milliGRAM(s) Oral daily  levothyroxine 112 MICROGram(s) Oral daily  metoprolol succinate ER 50 milliGRAM(s) Oral daily  mirtazapine 15 milliGRAM(s) Oral at bedtime  polyethylene glycol 3350 17 Gram(s) Oral daily  prednisoLONE acetate 1% Suspension 1 Drop(s) Right EYE four times a day  predniSONE   Tablet 40 milliGRAM(s) Oral daily  rivaroxaban 15 milliGRAM(s) Oral daily  senna 2 Tablet(s) Oral at bedtime  tamsulosin 0.4 milliGRAM(s) Oral two times a day  tiotropium 18 MICROgram(s) Capsule 1 Capsule(s) Inhalation daily    MEDICATIONS  (PRN):  acetaminophen   Tablet .. 650 milliGRAM(s) Oral every 6 hours PRN Mild Pain (1 - 3), Moderate Pain (4 - 6)  benzonatate 100 milliGRAM(s) Oral every 8 hours PRN Cough  dextrose 40% Gel 15 Gram(s) Oral once PRN Blood Glucose LESS THAN 70 milliGRAM(s)/deciliter  glucagon  Injectable 1 milliGRAM(s) IntraMuscular once PRN Glucose LESS THAN 70 milligrams/deciliter    LABS:	 	                          11.3   8.02  )-----------( 188      ( 2019 04:23 )             34.8   06-    141  |  107  |  26<H>  ----------------------------<  105<H>  3.7   |  22  |  0.92    Ca    8.7      2019 04:23  Phos  3.7       Mg     2.1     -      PTT - ( 2019 04:23 )  PTT:83.7 SEC             < from: Cardiac Cath Lab - Adult (17 @ 14:14) >  [facility]  Department of Cardiology  PTT - ( 2019 04:23 )  PTT:83.7 [id number] [place] [address] [phone]  Cath Lab Report -- Comprehensive Report  Patient: ÁNGEL GARZA  Study date: 2017  Account number: [id number]  MR number:[id number]  : 05/10/1934  Gender: Male  Race: O  Case Physician(s):  Shanti Bowie M.D.  Referring Physician:  INDICATIONS: Unstable angina - CCS4.  HISTORY: The patient has a history of coronary artery disease. The patient  has hypertension, medication-treated dyslipidemia, low HDL, and a family  history of coronary artery disease.  PROCEDURE:  --  Left heart catheterization with ventriculography.  --  Left coronary angiography.  --  Right coronary angiography.  --  Bypass graft angiography.  TECHNIQUE: The risks and alternatives of the procedures and conscious  sedation were explained to the patient and informed consent was obtained.  Cardiac catheterization performed electively.  Local anesthetic given. Right femoral artery access. Localanesthetic  given. A 5FR SHEATH PINNACLE was inserted in the vessel, utilizing the  modified Seldinger technique. Left heart catheterization. Ventriculography  was performed. A 5FR FR4.0 EXPO catheter was utilized. After recording  ascending aortic pressure, the catheter was advanced across the aortic  valve and left ventricular pressure was recorded. Imaging was performed  using an CAVAZOS projection. Post-ventriculography LV pressure was obtained.  The catheter was gradually withdrawn into the aorta under continuous  pressure monitoring and aortic pressure was recorded. Left coronary artery  angiography. The vessel was injected utilizing a 5FR FL4.0 EXPO catheter  and positioned in the vessel ostia under fluoroscopic guidance.  Angiography was performed in multiple projections using hand-injection of  contrast. Right coronary artery angiography. The vessel was injected  utilizing a 5FR FR4.0 EXPO catheter and positioned in the vessel ostia  under fluoroscopic guidance. Angiography was performed in multiple  projections using hand-injection of contrast. Graft angiography. The  vessel was injected utilizing a 5FR FR4.0 EXPO catheter and positioned in  the vessel ostia under fluoroscopic guidance. Angiography was performed in  multiple projections using hand-injection of contrast. RADIATION EXPOSURE:  5.5 min.  CONTRAST GIVEN: Omnipaque 28 ml.  VENTRICLES: EF estimated was 55 %.  CORONARY VESSELS: The coronary circulation is left dominant.  LM:   --  LM: Normal.  LAD:   --  Proximal LAD: There was a 100 % stenosis.  --  D1: There was a tubular 25 % stenosis at the site of a prior  angioplasty with stent placement.  CX:   --  LPDA: There was a 100 % stenosis.  RCA:   --  Mid RCA: There was a 100 % stenosis.  GRAFTS:   --  Graft to the mid LAD: The graft was a normal sized LIMA.  Distal vessel angiography showed mild diffuse disease.  --  Graft to the LPDA: The graft was a normal sized saphenous vein graft  from the aorta. Graft angiography showed mild atherosclerosis. Distal  vessel angiography showed mild diffuse disease.  COMPLICATIONS: There were no complications.  DIAGNOSTIC RECOMMENDATIONS: Medical management is recommended.  Prepared and signed by  Shanti Bowie M.D.  Signed 2017 18:16:49  HEMODYNAMIC TABLES  Pressures:  Baseline/ Room Air  Pressures:  - HR: 69  Pressures:  - Rhythm:  Pressures:  -- Aortic Pressure (S/D/M): 113/64/84  Pressures:  -- Left Ventricle (s/edp): 118/12/--  Outputs:  Baseline/ Room Air  Outputs:  -- CALCULATIONS[age]  Outputs:  -- CALCULATIONS: Body Surface Area: 1.78  Outputs:  -- CALCULATIONS: Height in cm: 167.00  Outputs:  -- CALCULATIONS: Sex: Male  Outputs:  -- CALCULATIONS: Weight in k.70    < end of copied text >      < from: CT Angio Chest w/ IV Cont (19 @ 21:39) >  EXAM:  CT ANGIO CHEST (W)AW IC        PROCEDURE DATE:  2019         INTERPRETATION:  CLINICAL INFORMATION: Dyspnea. Evaluate for pulmonary   embolus    COMPARISON: None.    PROCEDURE:   CT Angiography of the Chest.  90 ml of Omnipaque 350 was injected intravenously. 10 ml were discarded.  Sagittal and coronal reformats were performed as well as 3D (MIP)   reconstructions.      FINDINGS:    LUNGS AND AIRWAYS: Patent central airways.  Punctate left apical nodular   opacities likely distal mucus impaction. Bilateral parenchymal scarring.    PLEURA: No pleural effusion.    MEDIASTINUM AND CECY: No lymphadenopathy.    VESSELS: Poor bolus opacification and respiratory motion limits   evaluation for pulmonary embolus. No central or lobar pulmonary embolus.   Evaluation of the segmental and subsegmental pulmonary arteries is   nondiagnostic. Normal left-sided 3 vessel aortic arch.    HEART: Heart size is normal. No pericardial effusion.    CHEST WALL AND LOWER NECK: Status post midline sternotomy    VISUALIZED UPPER ABDOMEN: 2 cm heterogeneous left adrenal nodule. The   gallbladder is surgically absent. 1.7 cm indeterminate left renal lesion.    BONES: There is multilevel thoracic spondylosis.    IMPRESSION:     No central or lobar acute pulmonary arterial embolus. Nondiagnostic study   for acute pulmonary arterial embolus of the segmental and subsegmental   pulmonary arteries.    Indeterminate 2 cm left adrenal lesion.    1.7 cm indeterminate left renal lesion.      CR PENN M.D., RADIOLOGY RESIDENT  This document has been electronically signed.  AMERICA BULL M.D., ATTENDING RADIOLOGIST  This document has been electronically signed. 2019 12:10AM      < from: Transthoracic Echocardiogram (19 @ 09:52) >    Patient name: Ángel Garza  YOB: 1934   Age: 85 (M)   MR#: [id number]  Study Date: 2019  Location: [facility] Sonographer: Lauren Finkelstein, [facility]  Study quality: Technically good  Referring Physician: Edwar Wilkins MD  Blood Pressure: 102/61 mmHg  Height: 167 cm  Weight: 67 kg  BSA: 1.8 m2  ------------------------------------------------------------------------  PROCEDURE: Transthoracic echocardiogram with 2-D, M-Mode  and complete spectral and color flow Doppler.  INDICATION: Abnormal electrocardiogram (ECG) (EKG)  (R94.31), Shortness of breath (R06.02)  ------------------------------------------------------------------------  DIMENSIONS:  Dimensions:     Normal Values:  LA:     4.3 cm    2.0 - 4.0 cm  Ao:     3.0 cm    2.0- 3.8 cm  SEPTUM: 0.8 cm    0.6 - 1.2 cm  PWT:    0.8 cm    0.6 - 1.1 cm  LVIDd:  3.8 cm    3.0 - 5.6 cm  LVIDs:  2.5 cm    1.8 - 4.0 cm  Derived Variables:  LVMI: 49 g/m2  RWT: 0.42  Fractional short: 34 %  Ejection Fraction (Teicholtz): 64 %  ------------------------------------------------------------------------  OBSERVATIONS:  Mitral Valve: Mitral annular calcification, otherwise  normal mitral valve. Mild mitral regurgitation.  Aortic Root: Normal aortic root.  Aortic Valve: Calcified trileaflet aortic valve with normal  opening. Mild-moderate aortic regurgitation.  Left Atrium: Normal left atrium.  LA volume index = 24  cc/m2.  Left Ventricle: Normal left ventricular systolic function.  No segmental wall motion abnormalities. Normal left  ventricular internal dimensions and wall thicknesses.  Right Heart: Normal right atrium. The right ventricle is  not well visualized; grossly normal right ventricular  systolic function. Normal tricuspid valve.  Mild tricuspid  regurgitation. Normal pulmonic valve.  Pericardium/PleuraNormal pericardium with no pericardial  effusion.  ------------------------------------------------------------------------  CONCLUSIONS:  1. Mitral annular calcification, otherwise normal mitral  valve. Mild mitral regurgitation.  2. Calcified trileaflet aortic valve with normal opening.  Mild-moderate aortic regurgitation.  3. Normal left ventricular internal dimensions and wall  thicknesses.  4. Normal left ventricular systolic function. No segmental  wall motion abnormalities.  5. The right ventricle is not well visualized; grossly  normal right ventricular systolic function.  6. Normal tricuspid valve.  Mild tricuspid regurgitation.  ------------------------------------------------------------------------  Confirmed on  2019 - 14:00:20 by CORINA Leiva  ------------------------------------------------------------------------    < end of copied text >      < from: Nuclear Stress Test-Pharmacologic (19 @ 10:04) >    PATIENT: Ángel Garza  : 05/10/1934   AGE: 85 (M)   MR#: [id number]  STUDY DATE: 2019  LOCATION: [address]. PHYSICIAN(S): Cristian Tan MD / Edwar Wilkins MD  FELLOW: REBECCA Reddy PA  ------------------------------------------------------------------------  TYPE OF TEST: Stress Pharmacologic  INDICATION: Shortness of breath (R06.02)  ------------------------------------------------------------------------  HISTORY:  CARDIAC HISTORY: 85 year old man with past medical history  of CAD (s/p CABG, PCI), HTN, HLD, DM2 arrived to hospital  with cough and shortness of breath  OTHER HISTORY: prior PE, Melanoma (s/p keytruda), Colon Ca  (s/p partial colectomy)  RISK FACTORS: Past smoker, Diabetes, Hypertension, Family  History, Elevated Cholesterol  MEDICATIONS: ASA, Zithromax, Predinisone, Lipitor,  Symbicort, Imdur, Xopenex, Synthroid, Toprol, Flomax,  Spiriva, Remeron  ------------------------------------------------------------------------  BASELINE ELECTROCARDIOGRAM:  Rhythm: Normal Sinus Rhythm - 88 BPM  ST: No significant ST abnormalities.  ------------------------------------------------------------------------  HEMODYNAMIC PARAMETERS:                                      HR      BP  Baseline  Pre-Injection    64  100/52  00:00     Inject Regadenoson        67  00:30     Post Injection            66  01:00     Post Injection            81  106/63  02:00     Post Injection            88  03:00     Post Injection            87   88/49  04:00     Post Injection            88   83/48  05:00     Recovery                  86   89/44  06:00     Recovery                  86  100/54  07:12     Recovery                  82  ------------------------------------------------------------------------  Agent: Regadenoson 0.4 mg/5 ml NS. injected over 10 sec.  HR: Baseline HR: 64 bpm   Peak HR: 88 bpm (65% of MPHR)  MPHR: 135 bpm   85% of MPHR: 115 bpm  BP: Baseline BP: 100/52 mmHg   Peak BP: 106/63 mmHg   Peak  RPP: 9328 (Rate Pressure Product)  Last Caffeine intake: 12 hrs  Terminated: Completion of protocol  ------------------------------------------------------------------------  SYMPTOMS/FINDINGS:  Symptoms: Shortness of breath  Chest pain: No chest pain with administration of  Regadenoson  Treatment: None  ------------------------------------------------------------------------  ECG ABNORMALITIES DURING/AFTER STRESS:   Abnormalities: None  ------------------------------------------------------------------------  NEW ARRHYTHMIAS DEVELOPED DURING/AFTER STRESS:  Single APD occurred during recovery. Single VPD occurred  during stress.  ------------------------------------------------------------------------  STRESS TEST IMPRESSIONS:  Chest Pain: No chest pain with administration of  Regadenoson.  Symptom: Shortness of breath.  HR Response: Appropriate.  BP Response: Appropriate.  Heart Rhythm: Normal Sinus Rhythm - 88 BPM.  Baseline ECG: No significant ST abnormalities.  ECG Abnormalities: None.  Arrhythmia: Single APD occurred during recovery. Single  VPD occurred during stress.  ------------------------------------------------------------------------  PROCEDURE:  7.31 mCi of Tc99m Sestamibi were injected during stress  protocol. Approximately 45 minutes later, tomographic  images were obtained in a 180 degree arc from right  anterior oblique to left anterior oblique with 64 stops.  The tomographic slices were reconstructed in 3 orthogonal  planes (short axis, horizontal long axis and vertical long  axis).  Interpretation was performed both by visual and  quantitative analysis.  Stress images were acquired using CZT-based system with  pinhole collimation ([employer] 530 c, [employer]),  and reconstructed using MLEM algorithm. Images were  re-acquired with the patient in a prone position.  ------------------------------------------------------------------------  NUCLEAR FINDINGS:  Review of raw data shows: The study is of good technical  quality.  There is a small, mild defect in basal inferior wall that  corrects with prone imaging, suggestive of attenuation  artifact.  ------------------------------------------------------------------------  GATED ANALYSIS:  Post-stress gated wall motion analysis was performed (LVEF  = 67 %;LVEDV = 78 ml.), revealing normal LV function. RV  function appeared normal.  ------------------------------------------------------------------------  IMPRESSIONS:Normal Study  * Myocardial Perfusion SPECT results are normal.  * Review of raw data shows: The study is of good technical  quality.  * There is a small, mild defect in basal inferior wall  that corrects with prone imaging, suggestive of  attenuation artifact.  * Post-stress gated wall motion analysis was performed  (LVEF = 67 %;LVEDV = 78 ml.), revealing normal LV  function. RVfunction appeared normal.  * No clear evidence of ischemia.  ------------------------------------------------------------------------  Confirmed on  2019 - 16:20:07 by CORINA Leiva  ------------------------------------------------------------------------    < end of copied text > Cardiology/Vascular Medicine Inpatient Progress Note    No new complaints  Telemetry: SR, PVCs. No episodes of AFib noted on telemetry since  AM    With CHADSVASC 6, recommend anticoagulation  Started on rivaroxaban 15 mg daily yesterday  Monitoring Hgb/Hct    Vital Signs Last 24 Hrs  T(C): 36.4 (2019 05:57), Max: 36.6 (2019 21:22)  T(F): 97.6 (2019 05:57), Max: 97.9 (2019 21:22)  HR: 62 (2019 05:57) (61 - 76)  BP: 106/61 (2019 05:57) (91/55 - 107/45)  BP(mean): --  RR: 18 (2019 05:57) (15 - 18)  SpO2: 99% (2019 05:57) (98% - 99%)    Appearance: NAD, laying flat in bed  HEENT:   Normal oral mucosa, PERRL, EOMI	  Cardiovascular: Normal S1 S2, No JVD, 2-3/6 SM  Respiratory: Coarse breath sounds bilaterally  Psychiatry: Awake, alert  Gastrointestinal:  Soft, Non-tender, + BS	  Skin: No rashes, No ecchymoses, No cyanosis	  Neurologic: Non-focal  Extremities: Normal range of motion, No clubbing, cyanosis or edema  Vascular: Peripheral pulses palpable 2+ bilaterally    MEDICATIONS  (STANDING):  amitriptyline 25 milliGRAM(s) Oral at bedtime  aspirin enteric coated 81 milliGRAM(s) Oral daily  atorvastatin 40 milliGRAM(s) Oral at bedtime  buDESOnide 160 MICROgram(s)/formoterol 4.5 MICROgram(s) Inhaler 2 Puff(s) Inhalation two times a day  dextrose 5%. 1000 milliLiter(s) (50 mL/Hr) IV Continuous <Continuous>  dextrose 50% Injectable 12.5 Gram(s) IV Push once  dextrose 50% Injectable 25 Gram(s) IV Push once  dextrose 50% Injectable 25 Gram(s) IV Push once  docusate sodium 100 milliGRAM(s) Oral three times a day  insulin lispro (HumaLOG) corrective regimen sliding scale   SubCutaneous three times a day before meals  insulin lispro (HumaLOG) corrective regimen sliding scale   SubCutaneous at bedtime  isosorbide   mononitrate ER Tablet (IMDUR) 60 milliGRAM(s) Oral daily  levothyroxine 112 MICROGram(s) Oral daily  metoprolol succinate ER 50 milliGRAM(s) Oral daily  mirtazapine 15 milliGRAM(s) Oral at bedtime  polyethylene glycol 3350 17 Gram(s) Oral daily  prednisoLONE acetate 1% Suspension 1 Drop(s) Right EYE four times a day  predniSONE   Tablet 40 milliGRAM(s) Oral daily  rivaroxaban 15 milliGRAM(s) Oral daily  senna 2 Tablet(s) Oral at bedtime  tamsulosin 0.4 milliGRAM(s) Oral two times a day  tiotropium 18 MICROgram(s) Capsule 1 Capsule(s) Inhalation daily    MEDICATIONS  (PRN):  acetaminophen   Tablet .. 650 milliGRAM(s) Oral every 6 hours PRN Mild Pain (1 - 3), Moderate Pain (4 - 6)  benzonatate 100 milliGRAM(s) Oral every 8 hours PRN Cough  dextrose 40% Gel 15 Gram(s) Oral once PRN Blood Glucose LESS THAN 70 milliGRAM(s)/deciliter  glucagon  Injectable 1 milliGRAM(s) IntraMuscular once PRN Glucose LESS THAN 70 milligrams/deciliter    LABS:	 	                          11.3   8.02  )-----------( 188      ( 2019 04:23 )             34.8   06-23    141  |  107  |  26<H>  ----------------------------<  105<H>  3.7   |  22  |  0.92    Ca    8.7      2019 04:23  Phos  3.7       Mg     2.1     -      PTT - ( 2019 04:23 )  PTT:83.7 SEC             < from: Cardiac Cath Lab - Adult (17 @ 14:14) >  [facility]  Department of Cardiology  PTT - ( 2019 04:23 )  PTT:83.7 [id number] [place] [address] [phone]  Cath Lab Report -- Comprehensive Report  Patient: ÁNGEL GARZA  Study date: 2017  Account number: [id number]  MR number:[id number]  : 05/10/1934  Gender: Male  Race: O  Case Physician(s):  Shanti Bowie M.D.  Referring Physician:  INDICATIONS: Unstable angina - CCS4.  HISTORY: The patient has a history of coronary artery disease. The patient  has hypertension, medication-treated dyslipidemia, low HDL, and a family  history of coronary artery disease.  PROCEDURE:  --  Left heart catheterization with ventriculography.  --  Left coronary angiography.  --  Right coronary angiography.  --  Bypass graft angiography.  TECHNIQUE: The risks and alternatives of the procedures and conscious  sedation were explained to the patient and informed consent was obtained.  Cardiac catheterization performed electively.  Local anesthetic given. Right femoral artery access. Localanesthetic  given. A 5FR SHEATH PINNACLE was inserted in the vessel, utilizing the  modified Seldinger technique. Left heart catheterization. Ventriculography  was performed. A 5FR FR4.0 EXPO catheter was utilized. After recording  ascending aortic pressure, the catheter was advanced across the aortic  valve and left ventricular pressure was recorded. Imaging was performed  using an CAVAZOS projection. Post-ventriculography LV pressure was obtained.  The catheter was gradually withdrawn into the aorta under continuous  pressure monitoring and aortic pressure was recorded. Left coronary artery  angiography. The vessel was injected utilizing a 5FR FL4.0 EXPO catheter  and positioned in the vessel ostia under fluoroscopic guidance.  Angiography was performed in multiple projections using hand-injection of  contrast. Right coronary artery angiography. The vessel was injected  utilizing a 5FR FR4.0 EXPO catheter and positioned in the vessel ostia  under fluoroscopic guidance. Angiography was performed in multiple  projections using hand-injection of contrast. Graft angiography. The  vessel was injected utilizing a 5FR FR4.0 EXPO catheter and positioned in  the vessel ostia under fluoroscopic guidance. Angiography was performed in  multiple projections using hand-injection of contrast. RADIATION EXPOSURE:  5.5 min.  CONTRAST GIVEN: Omnipaque 28 ml.  VENTRICLES: EF estimated was 55 %.  CORONARY VESSELS: The coronary circulation is left dominant.  LM:   --  LM: Normal.  LAD:   --  Proximal LAD: There was a 100 % stenosis.  --  D1: There was a tubular 25 % stenosis at the site of a prior  angioplasty with stent placement.  CX:   --  LPDA: There was a 100 % stenosis.  RCA:   --  Mid RCA: There was a 100 % stenosis.  GRAFTS:   --  Graft to the mid LAD: The graft was a normal sized LIMA.  Distal vessel angiography showed mild diffuse disease.  --  Graft to the LPDA: The graft was a normal sized saphenous vein graft  from the aorta. Graft angiography showed mild atherosclerosis. Distal  vessel angiography showed mild diffuse disease.  COMPLICATIONS: There were no complications.  DIAGNOSTIC RECOMMENDATIONS: Medical management is recommended.  Prepared and signed by  Shanti Bowie M.D.  Signed 2017 18:16:49  HEMODYNAMIC TABLES  Pressures:  Baseline/ Room Air  Pressures:  - HR: 69  Pressures:  - Rhythm:  Pressures:  -- Aortic Pressure (S/D/M): 113/64/84  Pressures:  -- Left Ventricle (s/edp): 118/12/--  Outputs:  Baseline/ Room Air  Outputs:  -- CALCULATIONS[age]  Outputs:  -- CALCULATIONS: Body Surface Area: 1.78  Outputs:  -- CALCULATIONS: Height in cm: 167.00  Outputs:  -- CALCULATIONS: Sex: Male  Outputs:  -- CALCULATIONS: Weight in k.70    < end of copied text >      < from: CT Angio Chest w/ IV Cont (19 @ 21:39) >  EXAM:  CT ANGIO CHEST (W)AW IC        PROCEDURE DATE:  2019         INTERPRETATION:  CLINICAL INFORMATION: Dyspnea. Evaluate for pulmonary   embolus    COMPARISON: None.    PROCEDURE:   CT Angiography of the Chest.  90 ml of Omnipaque 350 was injected intravenously. 10 ml were discarded.  Sagittal and coronal reformats were performed as well as 3D (MIP)   reconstructions.      FINDINGS:    LUNGS AND AIRWAYS: Patent central airways.  Punctate left apical nodular   opacities likely distal mucus impaction. Bilateral parenchymal scarring.    PLEURA: No pleural effusion.    MEDIASTINUM AND CECY: No lymphadenopathy.    VESSELS: Poor bolus opacification and respiratory motion limits   evaluation for pulmonary embolus. No central or lobar pulmonary embolus.   Evaluation of the segmental and subsegmental pulmonary arteries is   nondiagnostic. Normal left-sided 3 vessel aortic arch.    HEART: Heart size is normal. No pericardial effusion.    CHEST WALL AND LOWER NECK: Status post midline sternotomy    VISUALIZED UPPER ABDOMEN: 2 cm heterogeneous left adrenal nodule. The   gallbladder is surgically absent. 1.7 cm indeterminate left renal lesion.    BONES: There is multilevel thoracic spondylosis.    IMPRESSION:     No central or lobar acute pulmonary arterial embolus. Nondiagnostic study   for acute pulmonary arterial embolus of the segmental and subsegmental   pulmonary arteries.    Indeterminate 2 cm left adrenal lesion.    1.7 cm indeterminate left renal lesion.      CR PENN M.D., RADIOLOGY RESIDENT  This document has been electronically signed.  AMERICA BULL M.D., ATTENDING RADIOLOGIST  This document has been electronically signed. 2019 12:10AM      < from: Transthoracic Echocardiogram (19 @ 09:52) >    Patient name: Ángel Garza  YOB: 1934   Age: 85 (M)   MR#: [id number]  Study Date: 2019  Location: [facility] Sonographer: Lauren Finkelstein, [facility]  Study quality: Technically good  Referring Physician: Edwar Wilkins MD  Blood Pressure: 102/61 mmHg  Height: 167 cm  Weight: 67 kg  BSA: 1.8 m2  ------------------------------------------------------------------------  PROCEDURE: Transthoracic echocardiogram with 2-D, M-Mode  and complete spectral and color flow Doppler.  INDICATION: Abnormal electrocardiogram (ECG) (EKG)  (R94.31), Shortness of breath (R06.02)  ------------------------------------------------------------------------  DIMENSIONS:  Dimensions:     Normal Values:  LA:     4.3 cm    2.0 - 4.0 cm  Ao:     3.0 cm    2.0- 3.8 cm  SEPTUM: 0.8 cm    0.6 - 1.2 cm  PWT:    0.8 cm    0.6 - 1.1 cm  LVIDd:  3.8 cm    3.0 - 5.6 cm  LVIDs:  2.5 cm    1.8 - 4.0 cm  Derived Variables:  LVMI: 49 g/m2  RWT: 0.42  Fractional short: 34 %  Ejection Fraction (Teicholtz): 64 %  ------------------------------------------------------------------------  OBSERVATIONS:  Mitral Valve: Mitral annular calcification, otherwise  normal mitral valve. Mild mitral regurgitation.  Aortic Root: Normal aortic root.  Aortic Valve: Calcified trileaflet aortic valve with normal  opening. Mild-moderate aortic regurgitation.  Left Atrium: Normal left atrium.  LA volume index = 24  cc/m2.  Left Ventricle: Normal left ventricular systolic function.  No segmental wall motion abnormalities. Normal left  ventricular internal dimensions and wall thicknesses.  Right Heart: Normal right atrium. The right ventricle is  not well visualized; grossly normal right ventricular  systolic function. Normal tricuspid valve.  Mild tricuspid  regurgitation. Normal pulmonic valve.  Pericardium/PleuraNormal pericardium with no pericardial  effusion.  ------------------------------------------------------------------------  CONCLUSIONS:  1. Mitral annular calcification, otherwise normal mitral  valve. Mild mitral regurgitation.  2. Calcified trileaflet aortic valve with normal opening.  Mild-moderate aortic regurgitation.  3. Normal left ventricular internal dimensions and wall  thicknesses.  4. Normal left ventricular systolic function. No segmental  wall motion abnormalities.  5. The right ventricle is not well visualized; grossly  normal right ventricular systolic function.  6. Normal tricuspid valve.  Mild tricuspid regurgitation.  ------------------------------------------------------------------------  Confirmed on  2019 - 14:00:20 by CORINA Leiva  ------------------------------------------------------------------------    < end of copied text >      < from: Nuclear Stress Test-Pharmacologic (19 @ 10:04) >    PATIENT: Ángel Garza  : 05/10/1934   AGE: 85 (M)   MR#: [id number]  STUDY DATE: 2019  LOCATION: [address]. PHYSICIAN(S): Cristian Tan MD / Edwar Wilkins MD  FELLOW: REBECCA Reddy PA  ------------------------------------------------------------------------  TYPE OF TEST: Stress Pharmacologic  INDICATION: Shortness of breath (R06.02)  ------------------------------------------------------------------------  HISTORY:  CARDIAC HISTORY: 85 year old man with past medical history  of CAD (s/p CABG, PCI), HTN, HLD, DM2 arrived to hospital  with cough and shortness of breath  OTHER HISTORY: prior PE, Melanoma (s/p keytruda), Colon Ca  (s/p partial colectomy)  RISK FACTORS: Past smoker, Diabetes, Hypertension, Family  History, Elevated Cholesterol  MEDICATIONS: ASA, Zithromax, Predinisone, Lipitor,  Symbicort, Imdur, Xopenex, Synthroid, Toprol, Flomax,  Spiriva, Remeron  ------------------------------------------------------------------------  BASELINE ELECTROCARDIOGRAM:  Rhythm: Normal Sinus Rhythm - 88 BPM  ST: No significant ST abnormalities.  ------------------------------------------------------------------------  HEMODYNAMIC PARAMETERS:                                      HR      BP  Baseline  Pre-Injection    64  100/52  00:00     Inject Regadenoson        67  00:30     Post Injection            66  01:00     Post Injection            81  106/63  02:00     Post Injection            88  03:00     Post Injection            87   88/49  04:00     Post Injection            88   83/48  05:00     Recovery                  86   89/44  06:00     Recovery                  86  100/54  07:12     Recovery                  82  ------------------------------------------------------------------------  Agent: Regadenoson 0.4 mg/5 ml NS. injected over 10 sec.  HR: Baseline HR: 64 bpm   Peak HR: 88 bpm (65% of MPHR)  MPHR: 135 bpm   85% of MPHR: 115 bpm  BP: Baseline BP: 100/52 mmHg   Peak BP: 106/63 mmHg   Peak  RPP: 9328 (Rate Pressure Product)  Last Caffeine intake: 12 hrs  Terminated: Completion of protocol  ------------------------------------------------------------------------  SYMPTOMS/FINDINGS:  Symptoms: Shortness of breath  Chest pain: No chest pain with administration of  Regadenoson  Treatment: None  ------------------------------------------------------------------------  ECG ABNORMALITIES DURING/AFTER STRESS:   Abnormalities: None  ------------------------------------------------------------------------  NEW ARRHYTHMIAS DEVELOPED DURING/AFTER STRESS:  Single APD occurred during recovery. Single VPD occurred  during stress.  ------------------------------------------------------------------------  STRESS TEST IMPRESSIONS:  Chest Pain: No chest pain with administration of  Regadenoson.  Symptom: Shortness of breath.  HR Response: Appropriate.  BP Response: Appropriate.  Heart Rhythm: Normal Sinus Rhythm - 88 BPM.  Baseline ECG: No significant ST abnormalities.  ECG Abnormalities: None.  Arrhythmia: Single APD occurred during recovery. Single  VPD occurred during stress.  ------------------------------------------------------------------------  PROCEDURE:  7.31 mCi of Tc99m Sestamibi were injected during stress  protocol. Approximately 45 minutes later, tomographic  images were obtained in a 180 degree arc from right  anterior oblique to left anterior oblique with 64 stops.  The tomographic slices were reconstructed in 3 orthogonal  planes (short axis, horizontal long axis and vertical long  axis).  Interpretation was performed both by visual and  quantitative analysis.  Stress images were acquired using CZT-based system with  pinhole collimation ([employer] 530 c, [employer]),  and reconstructed using MLEM algorithm. Images were  re-acquired with the patient in a prone position.  ------------------------------------------------------------------------  NUCLEAR FINDINGS:  Review of raw data shows: The study is of good technical  quality.  There is a small, mild defect in basal inferior wall that  corrects with prone imaging, suggestive of attenuation  artifact.  ------------------------------------------------------------------------  GATED ANALYSIS:  Post-stress gated wall motion analysis was performed (LVEF  = 67 %;LVEDV = 78 ml.), revealing normal LV function. RV  function appeared normal.  ------------------------------------------------------------------------  IMPRESSIONS:Normal Study  * Myocardial Perfusion SPECT results are normal.  * Review of raw data shows: The study is of good technical  quality.  * There is a small, mild defect in basal inferior wall  that corrects with prone imaging, suggestive of  attenuation artifact.  * Post-stress gated wall motion analysis was performed  (LVEF = 67 %;LVEDV = 78 ml.), revealing normal LV  function. RVfunction appeared normal.  * No clear evidence of ischemia.  ------------------------------------------------------------------------  Confirmed on  2019 - 16:20:07 by CORINA Leiva  ------------------------------------------------------------------------    < end of copied text >

## 2019-06-24 NOTE — PROGRESS NOTE ADULT - PROBLEM SELECTOR PLAN 7
IMPROVE VTE Individual Risk Assessment        RISK                                                          Points  [  ] Previous VTE                                               3  [  ] Thrombophilia                                            2  [  ] Lower limb paresis/paralysis                    2    [ x ] Active Cancer (in last 6 months)              2   [ x ] Immobilization > 24 hrs                             1  [  ] ICU/CCU stay > 24 hours                            1  [  x] Age > 60                                                        1                                            Total Score ____3_____
IMPROVE VTE Individual Risk Assessment        RISK                                                          Points  [  ] Previous VTE                                               3  [  ] Thrombophilia                                            2  [  ] Lower limb paresis/paralysis                    2    [ x ] Active Cancer (in last 6 months)              2   [ x ] Immobilization > 24 hrs                             1  [  ] ICU/CCU stay > 24 hours                            1  [  x] Age > 60                                                        1                                            Total Score ____3_____    Dispo - DC Planning 35 mins

## 2019-06-24 NOTE — PROGRESS NOTE ADULT - ASSESSMENT
Patient is an 85 year old man with HTN, DM2 not on insulin, Colon Ca s/p partial colectomy (3/2019)  Melanoma s/p keytruda, PE s/p Xarelto (reported to have stopped 3 months ago)  hyperlipidemia, COPD not on home O2, CAD s/p Stent and CABG.  As per family, patient has a history of AFib, stopped taking anticoagulation because of history of GIB.    Stress test 6/21 -- no evidence of ischemia  Noted to have atrial fibrillation with rapid ventricular response on telemetry (with ventricular rates to 130s bpm) on morning of 6/21.  Currently in SR  With CHADSVASC 6, recommend anticoagulation   Started on rivaroxaban 15 mg daily yesterday  Monitoring Hgb/Hct

## 2019-06-24 NOTE — DISCHARGE NOTE PROVIDER - CARE PROVIDERS DIRECT ADDRESSES
[email or web address]

## 2019-06-28 ENCOUNTER — APPOINTMENT (OUTPATIENT)
Dept: PULMONOLOGY | Facility: CLINIC | Age: 84
End: 2019-06-28
Payer: MEDICARE

## 2019-06-28 VITALS
RESPIRATION RATE: 15 BRPM | BODY MASS INDEX: 23.4 KG/M2 | DIASTOLIC BLOOD PRESSURE: 64 MMHG | SYSTOLIC BLOOD PRESSURE: 110 MMHG | TEMPERATURE: 97.3 F | WEIGHT: 145 LBS | HEART RATE: 69 BPM | OXYGEN SATURATION: 96 %

## 2019-06-28 DIAGNOSIS — R05 COUGH: ICD-10-CM

## 2019-06-28 DIAGNOSIS — R06.02 SHORTNESS OF BREATH: ICD-10-CM

## 2019-06-28 DIAGNOSIS — Z86.711 PERSONAL HISTORY OF PULMONARY EMBOLISM: ICD-10-CM

## 2019-06-28 DIAGNOSIS — I25.10 ATHEROSCLEROTIC HEART DISEASE OF NATIVE CORONARY ARTERY W/OUT ANGINA PECTORIS: ICD-10-CM

## 2019-06-28 DIAGNOSIS — Z85.820 PERSONAL HISTORY OF MALIGNANT MELANOMA OF SKIN: ICD-10-CM

## 2019-06-28 DIAGNOSIS — Z86.79 PERSONAL HISTORY OF OTHER DISEASES OF THE CIRCULATORY SYSTEM: ICD-10-CM

## 2019-06-28 PROCEDURE — 99204 OFFICE O/P NEW MOD 45 MIN: CPT

## 2019-06-28 NOTE — DISCUSSION/SUMMARY
[FreeTextEntry1] : 86yo Croatian-speaking male presenting for hospital f/u for acute onset shortness of breath with cough, presumably COPD exacerbation.  CTPA negative for PE.  ECHO reviewed, showing normal EF, mild MR and mild-moderate AR.  No lung functions available today.  Patient appears to be well today, however in light of his recent smoking history, will have patient c/w Advair, Ventolin, Spiriva and Montelukast per hospital order.  F/u in 1 month with PFT.  I will speak with his daughter (Cate) who is a cardiologist to go over the plan of care.  F/u with cardiology in regards to his Xarelto management.  His heart rhythm sounded regular today.

## 2019-06-28 NOTE — REVIEW OF SYSTEMS
[Fatigue] : fatigue [Cough] : cough [Sputum] : sputum  [Dyspnea] : dyspnea [Hypertension] : ~T hypertension [Diabetes] : diabetes mellitus [As Noted in HPI] : as noted in HPI [Frequency] : urinary frequency [Negative] : Sleep Disorder [Chills] : no chills [Fever] : no fever [Poor Appetite] : normal appetite  [Chest Tightness] : no chest tightness [Hemoptysis] : no hemoptysis [Chest Discomfort] : no chest discomfort [Wheezing] : no wheezing [Pleuritic Pain] : no pleuritic pain [Thyroid Problem] : no thyroid problem [Orthopnea] : no orthopnea [Edema] : ~T edema was not present

## 2019-06-28 NOTE — HISTORY OF PRESENT ILLNESS
[FreeTextEntry1] : 86yo Cypriot-speaking male presenting for hospital f/u for acute onset shortness of breath with cough, presumably COPD exacerbation.  PMH HTN, DM, colon cancer s/p partial colectomy, melanoma s/p Keytruda, PE s/p Xarelto (stopped 3-6 months ago due to epistaxis), HTN, HLD, CAD s/p stent and CABG.  He was found to be in atrial fibrillation, 130s, inpatient but converted to NSR on 6/21.  Discharged home with metoprolol for rate management and Xarelto.  ECHO completed showed mild-moderate aortic regurgitation with normal EF.  CTPA negative for PE.  He received steroids and azithromycin.\par \par Today, feels better in that he is no longer short of breath.  Still with a slight productive cough, but no fevers or chills.  Generally feels fatigued since his hospitalization.  Has a [id number] smoking history, but quit 35 years ago.  No occupational exposures.  Takes Advair, Ventolin, Spiriva, and Singulair.\par \par Cypriot  (ID#): Eva ([id number]); Angelica ([id number])\par \par \par

## 2019-06-28 NOTE — END OF VISIT
[FreeTextEntry3] : I agree with the nurse practitioners history, physical examination and plan of care. \par

## 2019-07-19 ENCOUNTER — APPOINTMENT (OUTPATIENT)
Dept: CARDIOLOGY | Facility: CLINIC | Age: 84
End: 2019-07-19

## 2019-07-26 ENCOUNTER — APPOINTMENT (OUTPATIENT)
Dept: PULMONOLOGY | Facility: CLINIC | Age: 84
End: 2019-07-26

## 2020-01-01 NOTE — PROGRESS NOTE ADULT - PROBLEM SELECTOR PROBLEM 6
[Use of Plain Language] : use of plain language
Hyperlipidemia, unspecified hyperlipidemia type

## 2020-10-04 NOTE — PROGRESS NOTE ADULT - PROBLEM/PLAN-2
DISPLAY PLAN FREE TEXT
[Initial Visit ___] : [unfilled] is here today for an initial visit  for [unfilled]
DISPLAY PLAN FREE TEXT

## 2021-04-09 NOTE — PROGRESS NOTE ADULT - PROBLEM SELECTOR PROBLEM 9
· Likely type 2 MI in setting of HTN urgency   · Troponins trended and were stable  · EKG no ischemia  · Telemetry - patient self removed this has been discontinued  · Denies cp/sob Melanoma

## 2021-08-06 NOTE — PROGRESS NOTE ADULT - SUBJECTIVE AND OBJECTIVE BOX
Chief Complaint   Patient presents with   • Back Pain       Patient Active Problem List   Diagnosis   • Type 2 diabetes mellitus without complication, without long-term current use of insulin (CMS/[facility]ly [facility])   • Polyneuropathy   • Other chest pain   • Pain- Interventional Pain Management   • Lung nodule- 4 mm Apical, repeat CT lung in 12 months =5/2020       The patient is a 62 year old  female who presents with chief complaint of all over body pain    HPI:    Tana Jones has a history of all over body pain.  [place] conducted with assistance of Haitian interpretor. She was last seen on 11/5/19 for consultation. At that time she was complaining primarily of paresthesias and burning in her feet. Today she reports that those symptoms are still there, but also that she has pain in the entire body and all joints. She states that the only thing that doesn't hurt is her hair. She is requesting testing to underlying conditions. She continues to take Lyrica and Gabapentin which are prescribed by podiatry. She took one tablet of the Effexor and states that her stomach was burning within ten minutes. She states that she took the medication on a full stomach and with an entire glass of water. She did not take an additional dose and would like to avoid additional medications at this time. She also states that she fell and injured her right ankle. She cannot tell me at all when the fall was and just states that she does not remember. She was put on a light duty work restriction by her PCP and is asking that we release her back to full duty work, as she has not been following the work restrictions as she is feeling better.     Pain is described as: Burning, Deep and Achy    Since the last visit, average daily pain is: Same    Pain is worsened with  Constant pain  Pain is improved with nothing    Review of Systems  Patient denies: Loss of Control of Bowel, Loss of Control of Bladder, Recurrent Fever, Unexplained Weight Loss and  Cardiology/Vascular Medicine Inpatient Progress Note    No new complaints  Telemetry: AFib noted on telemetry this 3Am with ventricular rates to 130s bpm    With CHADSVASC 6, recommend anticoagulation if patient able to tolerate  Will need to receive clearance for anticoagulation from GI/Gi surgery  Can consider heparin gtt challenge while in hospital    Vital Signs Last 24 Hrs  T(C): 36.7 (2019 23:06), Max: 36.7 (2019 23:06)  T(F): 98.1 (2019 23:06), Max: 98.1 (2019 23:06)  HR: 73 (2019 23:06) (73 - 78)  BP: 112/70 (2019 23:06) (112/70 - 116/63)  BP(mean): --  RR: 18 (2019 23:06) (18 - 18)  SpO2: 100% (2019 23:06) (97% - 100%)    Appearance: NAD, laying flat in bed  HEENT:   Normal oral mucosa, PERRL, EOMI	  Cardiovascular: Normal S1 S2, No JVD, 2-3/6 SM  Respiratory: Coarse breath sounds bilaterally  Psychiatry: Awake, alert  Gastrointestinal:  Soft, Non-tender, + BS	  Skin: No rashes, No ecchymoses, No cyanosis	  Neurologic: Non-focal  Extremities: Normal range of motion, No clubbing, cyanosis or edema  Vascular: Peripheral pulses palpable 2+ bilaterally    MEDICATIONS  (STANDING):  amitriptyline 25 milliGRAM(s) Oral at bedtime  aspirin enteric coated 81 milliGRAM(s) Oral daily  atorvastatin 40 milliGRAM(s) Oral at bedtime  buDESOnide 160 MICROgram(s)/formoterol 4.5 MICROgram(s) Inhaler 2 Puff(s) Inhalation two times a day  dextrose 5%. 1000 milliLiter(s) (50 mL/Hr) IV Continuous <Continuous>  dextrose 50% Injectable 12.5 Gram(s) IV Push once  dextrose 50% Injectable 25 Gram(s) IV Push once  dextrose 50% Injectable 25 Gram(s) IV Push once  docusate sodium 100 milliGRAM(s) Oral three times a day  enoxaparin Injectable 40 milliGRAM(s) SubCutaneous every 24 hours  insulin lispro (HumaLOG) corrective regimen sliding scale   SubCutaneous three times a day before meals  insulin lispro (HumaLOG) corrective regimen sliding scale   SubCutaneous at bedtime  isosorbide   mononitrate ER Tablet (IMDUR) 60 milliGRAM(s) Oral daily  levothyroxine 112 MICROGram(s) Oral daily  metoprolol succinate ER 50 milliGRAM(s) Oral daily  mirtazapine 15 milliGRAM(s) Oral at bedtime  polyethylene glycol 3350 17 Gram(s) Oral daily  prednisoLONE acetate 1% Suspension 1 Drop(s) Right EYE four times a day  predniSONE   Tablet 40 milliGRAM(s) Oral daily  senna 2 Tablet(s) Oral at bedtime  tamsulosin 0.4 milliGRAM(s) Oral two times a day  tiotropium 18 MICROgram(s) Capsule 1 Capsule(s) Inhalation daily    MEDICATIONS  (PRN):  acetaminophen   Tablet .. 650 milliGRAM(s) Oral every 6 hours PRN Mild Pain (1 - 3), Moderate Pain (4 - 6)  benzonatate 100 milliGRAM(s) Oral every 8 hours PRN Cough  dextrose 40% Gel 15 Gram(s) Oral once PRN Blood Glucose LESS THAN 70 milliGRAM(s)/deciliter  glucagon  Injectable 1 milliGRAM(s) IntraMuscular once PRN Glucose LESS THAN 70 milligrams/deciliter      LABS:	 	                                 10.9   12.43 )-----------( 184      ( 2019 06:20 )             33.6   06-21    141  |  109<H>  |  31<H>  ----------------------------<  111<H>  3.9   |  21<L>  |  0.98    Ca    9.2      2019 06:20  Phos  3.4     06-21  Mg     1.9     06-21      < from: Cardiac Cath Lab - Adult (17 @ 14:14) >  [facility]  Department of Cardiology  [address] [place] [address]  [phone]  Cath Lab Report -- Comprehensive Report  Patient: ÁNGLE GARZA  Study date: 2017  Account number: [id number]  MR number:[id number]  : 05/10/1934  Gender: Male  Race: O  Case Physician(s):  Shanti Bowie M.D.  Referring Physician:  INDICATIONS: Unstable angina - CCS4.  HISTORY: The patient has a history of coronary artery disease. The patient  has hypertension, medication-treated dyslipidemia, low HDL, and a family  history of coronary artery disease.  PROCEDURE:  --  Left heart catheterization with ventriculography.  --  Left coronary angiography.  --  Right coronary angiography.  --  Bypass graft angiography.  TECHNIQUE: The risks and alternatives of the procedures and conscious  sedation were explained to the patient and informed consent was obtained.  Cardiac catheterization performed electively.  Local anesthetic given. Right femoral artery access. Localanesthetic  given. A 5FR SHEATH PINNACLE was inserted in the vessel, utilizing the  modified Seldinger technique. Left heart catheterization. Ventriculography  was performed. A 5FR FR4.0 EXPO catheter was utilized. After recording  ascending aortic pressure, the catheter was advanced across the aortic  valve and left ventricular pressure was recorded. Imaging was performed  using an CAVAZOS projection. Post-ventriculography LV pressure was obtained.  The catheter was gradually withdrawn into the aorta under continuous  pressure monitoring and aortic pressure was recorded. Left coronary artery  angiography. The vessel was injected utilizing a 5FR FL4.0 EXPO catheter  and positioned in the vessel ostia under fluoroscopic guidance.  Angiography was performed in multiple projections using hand-injection of  contrast. Right coronary artery angiography. The vessel was injected  utilizing a 5FR FR4.0 EXPO catheter and positioned in the vessel ostia  under fluoroscopic guidance. Angiography was performed in multiple  projections using hand-injection of contrast. Graft angiography. The  vessel was injected utilizing a 5FR FR4.0 EXPO catheter and positioned in  the vessel ostia under fluoroscopic guidance. Angiography was performed in  multiple projections using hand-injection of contrast. RADIATION EXPOSURE:  5.5 min.  CONTRAST GIVEN: Omnipaque 28 ml.  VENTRICLES: EF estimated was 55 %.  CORONARY VESSELS: The coronary circulation is left dominant.  LM:   --  LM: Normal.  LAD:   --  Proximal LAD: There was a 100 % stenosis.  --  D1: There was a tubular 25 % stenosis at the site of a prior  angioplasty with stent placement.  CX:   --  LPDA: There was a 100 % stenosis.  RCA:   --  Mid RCA: There was a 100 % stenosis.  GRAFTS:   --  Graft to the mid LAD: The graft was a normal sized LIMA.  Distal vessel angiography showed mild diffuse disease.  --  Graft to the LPDA: The graft was a normal sized saphenous vein graft  from the aorta. Graft angiography showed mild atherosclerosis. Distal  vessel angiography showed mild diffuse disease.  COMPLICATIONS: There were no complications.  DIAGNOSTIC RECOMMENDATIONS: Medical management is recommended.  Prepared and signed by  Shanti Bowie M.D.  Signed 2017 18:16:49  HEMODYNAMIC TABLES  Pressures:  Baseline/ Room Air  Pressures:  - HR: 69  Pressures:  - Rhythm:  Pressures:  -- Aortic Pressure (S/D/M): 113/64/84  Pressures:  -- Left Ventricle (s/edp): 118/12/--  Outputs:  Baseline/ Room Air  Outputs:  -- CALCULATIONS[age]  Outputs:  -- CALCULATIONS: Body Surface Area: 1.78  Outputs:  -- CALCULATIONS: Height in cm: 167.00  Outputs:  -- CALCULATIONS: Sex: Male  Outputs:  -- CALCULATIONS: Weight in k.70    < end of copied text >      < from: CT Angio Chest w/ IV Cont (19 @ 21:39) >  EXAM:  CT ANGIO CHEST (W)AW IC        PROCEDURE DATE:  2019         INTERPRETATION:  CLINICAL INFORMATION: Dyspnea. Evaluate for pulmonary   embolus    COMPARISON: None.    PROCEDURE:   CT Angiography of the Chest.  90 ml of Omnipaque 350 was injected intravenously. 10 ml were discarded.  Sagittal and coronal reformats were performed as well as 3D (MIP)   reconstructions.      FINDINGS:    LUNGS AND AIRWAYS: Patent central airways.  Punctate left apical nodular   opacities likely distal mucus impaction. Bilateral parenchymal scarring.    PLEURA: No pleural effusion.    MEDIASTINUM AND CECY: No lymphadenopathy.    VESSELS: Poor bolus opacification and respiratory motion limits   evaluation for pulmonary embolus. No central or lobar pulmonary embolus.   Evaluation of the segmental and subsegmental pulmonary arteries is   nondiagnostic. Normal left-sided 3 vessel aortic arch.    HEART: Heart size is normal. No pericardial effusion.    CHEST WALL AND LOWER NECK: Status post midline sternotomy    VISUALIZED UPPER ABDOMEN: 2 cm heterogeneous left adrenal nodule. The   gallbladder is surgically absent. 1.7 cm indeterminate left renal lesion.    BONES: There is multilevel thoracic spondylosis.    IMPRESSION:     No central or lobar acute pulmonary arterial embolus. Nondiagnostic study   for acute pulmonary arterial embolus of the segmental and subsegmental   pulmonary arteries.    Indeterminate 2 cm left adrenal lesion.    1.7 cm indeterminate left renal lesion.      CR PENN M.D., RADIOLOGY RESIDENT  This document has been electronically signed.  AMERICA BULL M.D., ATTENDING RADIOLOGIST  This document has been electronically signed. 2019 12:10AM      < from: Transthoracic Echocardiogram (19 @ 09:52) >    Patient name: Ángel Graza  YOB: 1934   Age: 85 (M)   MR#: [id number]  Study Date: 2019  Location: [facility] Sonographer: Lauren Finkelstein, [facility]  Study quality: Technically good  Referring Physician: Edwar Wilkins MD  Blood Pressure: 102/61 mmHg  Height: 167 cm  Weight: 67 kg  BSA: 1.8 m2  ------------------------------------------------------------------------  PROCEDURE: Transthoracic echocardiogram with 2-D, M-Mode  and complete spectral and color flow Doppler.  INDICATION: Abnormal electrocardiogram (ECG) (EKG)  (R94.31), Shortness of breath (R06.02)  ------------------------------------------------------------------------  DIMENSIONS:  Dimensions:     Normal Values:  LA:     4.3 cm    2.0 - 4.0 cm  Ao:     3.0 cm    2.0- 3.8 cm  SEPTUM: 0.8 cm    0.6 - 1.2 cm  PWT:    0.8 cm    0.6 - 1.1 cm  LVIDd:  3.8 cm    3.0 - 5.6 cm  LVIDs:  2.5 cm    1.8 - 4.0 cm  Derived Variables:  LVMI: 49 g/m2  RWT: 0.42  Fractional short: 34 %  Ejection Fraction (Teicholtz): 64 %  ------------------------------------------------------------------------  OBSERVATIONS:  Mitral Valve: Mitral annular calcification, otherwise  normal mitral valve. Mild mitral regurgitation.  Aortic Root: Normal aortic root.  Aortic Valve: Calcified trileaflet aortic valve with normal  opening. Mild-moderate aortic regurgitation.  Left Atrium: Normal left atrium.  LA volume index = 24  cc/m2.  Left Ventricle: Normal left ventricular systolic function.  No segmental wall motion abnormalities. Normal left  ventricular internal dimensions and wall thicknesses.  Right Heart: Normal right atrium. The right ventricle is  not well visualized; grossly normal right ventricular  systolic function. Normal tricuspid valve.  Mild tricuspid  regurgitation. Normal pulmonic valve.  Pericardium/PleuraNormal pericardium with no pericardial  effusion.  ------------------------------------------------------------------------  CONCLUSIONS:  1. Mitral annular calcification, otherwise normal mitral  valve. Mild mitral regurgitation.  2. Calcified trileaflet aortic valve with normal opening.  Mild-moderate aortic regurgitation.  3. Normal left ventricular internal dimensions and wall  thicknesses.  4. Normal left ventricular systolic function. No segmental  wall motion abnormalities.  5. The right ventricle is not well visualized; grossly  normal right ventricular systolic function.  6. Normal tricuspid valve.  Mild tricuspid regurgitation.  ------------------------------------------------------------------------  Confirmed on  2019 - 14:00:20 by CORINA Leiva  ------------------------------------------------------------------------    < end of copied text > Night Sweats    Pain Procedures  No problems updated.     Pain score:    Pain on average day: 8  Pain after taking medication: 8  Is current pain at a tolerable level for you? No    Other:     Suicidal thoughts: No    Current Outpatient Medications   Medication Sig   • polyethylene glycol-electrolytes (NULYTELY WITH FLAVOR PACKS) 420 g solution Take 4,000 mLs by mouth 1 time.   • venlafaxine (EFFEXOR) 37.5 MG tablet Start once daily for two weeks, then increase to two capsules daily   • zoster vaccine recomb adjuvanted (SHINGRIX) 50 MCG/0.5ML injection Inject 0.5 mLs into the muscle 1 time. Repeat dose in 2 to 6 months (unless 1 dose already given), for a total of 2 doses.   • omeprazole (PRILOSEC) 40 MG capsule Take 1 capsule by mouth daily.   • pregabalin (LYRICA) 75 MG capsule Take 1 capsule by mouth 2 times daily. Start with 1 daily for a week then increase to bid   • gabapentin (NEURONTIN) 300 MG capsule Take 1 capsule by mouth 3 times daily.   • azelastine (OPTIVAR) 0.05 % ophthalmic solution Place 1 drop into both eyes 2 times daily.     No current facility-administered medications for this visit.        Visit Vitals  /82   Pulse 54   Resp 18   SpO2 98%       Physical Exam    Constitutional: She is oriented to person, place, and time. She appears well-developed and well-nourished. She is cooperative.    Head: Normocephalic and atraumatic.   Abdominal:soft and not tender, no masses or pulsations felt.   Skin: Skin is warm and dry.   Psychiatric: She has a normal mood and affect. Her speech is normal and behavior is normal. Judgment and thought content normal. Cognition and memory are normal.   Musculoskeletal: Diffuse tenderness to palpation of entire spine.   Right ankle: She exhibits normal range of motion,no tenderness or pain with palpation or ROM. She exhibits no bony tenderness, no swelling and no edema.     Neurological: She is alert and oriented to person, place, and time. She has normal  strength. No cranial nerve deficit or sensory deficit. Coordination and gait normal.       Assessment:  All over body pain  Polyarthralgia  Right ankle pain s/p fall  Polyneuropathy       Co-Morbidities: DM  Slovak interpretor needed       Plan:    - Last Platelet Count No results found for: PLT  - Blood thinners: NO    Medication Management  - Lyrica and Gabapentin per podiatry  - Educated patient on proper use of medication. Side effects of medication discussed with patient.   - PDMP reviewed and appropriate.     Interventional Therapy  -Referral to Rheumatology- patient requesting work-up for all over body pain and polyarthalgia    -Patient has failed conservative management, physical therapy and non steroidal anti- inflammatories in managing pain.   -Continue HEP    Counseling and Discussion  - As above  - She was advised to follow-up prn. She was advised to call the office with any questions or concerns.       SUMMER Telles       [facility]  [facility]  MD Holley Call NP Sarah Bahe, NP    - [phone]  [phone]    New Prescriptions    No medications on file        Modified Medications    No medications on file       Orders Placed This Encounter   • SERVICE TO RHEUMATOLOGY IMMUNOLOGY            Cardiology/Vascular Medicine Inpatient Progress Note    No new complaints  Telemetry: SR, PACs, PVCs. No episodes of AFib noted on telemetry since yesterday morning's event    With CHADSVASC 6, recommend anticoagulation if patient able to tolerate  Will need to receive clearance for anticoagulation from GI/Gi surgery  Can consider heparin gtt challenge while in hospital    Vital Signs Last 24 Hrs  T(C): 36.8 (2019 06:19), Max: 36.8 (2019 06:19)  T(F): 98.2 (2019 06:19), Max: 98.2 (2019 06:19)  HR: 62 (2019 06:19) (62 - 78)  BP: 106/46 (2019 06:19) (106/46 - 116/63)  BP(mean): --  RR: 18 (2019 06:19) (18 - 18)  SpO2: 100% (2019 06:19) (97% - 100%)    Appearance: NAD, laying flat in bed  HEENT:   Normal oral mucosa, PERRL, EOMI	  Cardiovascular: Normal S1 S2, No JVD, 2-3/6 SM  Respiratory: Coarse breath sounds bilaterally  Psychiatry: Awake, alert  Gastrointestinal:  Soft, Non-tender, + BS	  Skin: No rashes, No ecchymoses, No cyanosis	  Neurologic: Non-focal  Extremities: Normal range of motion, No clubbing, cyanosis or edema  Vascular: Peripheral pulses palpable 2+ bilaterally    MEDICATIONS  (STANDING):  amitriptyline 25 milliGRAM(s) Oral at bedtime  aspirin enteric coated 81 milliGRAM(s) Oral daily  atorvastatin 40 milliGRAM(s) Oral at bedtime  buDESOnide 160 MICROgram(s)/formoterol 4.5 MICROgram(s) Inhaler 2 Puff(s) Inhalation two times a day  dextrose 5%. 1000 milliLiter(s) (50 mL/Hr) IV Continuous <Continuous>  dextrose 50% Injectable 12.5 Gram(s) IV Push once  dextrose 50% Injectable 25 Gram(s) IV Push once  dextrose 50% Injectable 25 Gram(s) IV Push once  docusate sodium 100 milliGRAM(s) Oral three times a day  enoxaparin Injectable 40 milliGRAM(s) SubCutaneous every 24 hours  insulin lispro (HumaLOG) corrective regimen sliding scale   SubCutaneous three times a day before meals  insulin lispro (HumaLOG) corrective regimen sliding scale   SubCutaneous at bedtime  isosorbide   mononitrate ER Tablet (IMDUR) 60 milliGRAM(s) Oral daily  levothyroxine 112 MICROGram(s) Oral daily  metoprolol succinate ER 50 milliGRAM(s) Oral daily  mirtazapine 15 milliGRAM(s) Oral at bedtime  polyethylene glycol 3350 17 Gram(s) Oral daily  prednisoLONE acetate 1% Suspension 1 Drop(s) Right EYE four times a day  predniSONE   Tablet 40 milliGRAM(s) Oral daily  senna 2 Tablet(s) Oral at bedtime  tamsulosin 0.4 milliGRAM(s) Oral two times a day  tiotropium 18 MICROgram(s) Capsule 1 Capsule(s) Inhalation daily    MEDICATIONS  (PRN):  acetaminophen   Tablet .. 650 milliGRAM(s) Oral every 6 hours PRN Mild Pain (1 - 3), Moderate Pain (4 - 6)  benzonatate 100 milliGRAM(s) Oral every 8 hours PRN Cough  dextrose 40% Gel 15 Gram(s) Oral once PRN Blood Glucose LESS THAN 70 milliGRAM(s)/deciliter  glucagon  Injectable 1 milliGRAM(s) IntraMuscular once PRN Glucose LESS THAN 70 milligrams/deciliter      LABS:	 	                            11.0   8.06  )-----------( 190      ( 2019 07:18 )             34.2     06-22    143  |  109<H>  |  28<H>  ----------------------------<  90  3.8   |  21<L>  |  0.94    Ca    8.7      2019 07:18  Phos  3.5     06-22  Mg     2.0     06-22        < from: Cardiac Cath Lab - Adult (17 @ 14:14) >  [facility]  Department of Cardiology  [address] [place] [address]  [phone]  Cath Lab Report -- Comprehensive Report  Patient: ÁNGEL GARZA  Study date: 2017  Account number: [id number]  MR number:[id number]  : 05/10/1934  Gender: Male  Race: O  Case Physician(s):  Shanti Bowie M.D.  Referring Physician:  INDICATIONS: Unstable angina - CCS4.  HISTORY: The patient has a history of coronary artery disease. The patient  has hypertension, medication-treated dyslipidemia, low HDL, and a family  history of coronary artery disease.  PROCEDURE:  --  Left heart catheterization with ventriculography.  --  Left coronary angiography.  --  Right coronary angiography.  --  Bypass graft angiography.  TECHNIQUE: The risks and alternatives of the procedures and conscious  sedation were explained to the patient and informed consent was obtained.  Cardiac catheterization performed electively.  Local anesthetic given. Right femoral artery access. Localanesthetic  given. A 5FR SHEATH PINNACLE was inserted in the vessel, utilizing the  modified Seldinger technique. Left heart catheterization. Ventriculography  was performed. A 5FR FR4.0 EXPO catheter was utilized. After recording  ascending aortic pressure, the catheter was advanced across the aortic  valve and left ventricular pressure was recorded. Imaging was performed  using an CAVAZOS projection. Post-ventriculography LV pressure was obtained.  The catheter was gradually withdrawn into the aorta under continuous  pressure monitoring and aortic pressure was recorded. Left coronary artery  angiography. The vessel was injected utilizing a 5FR FL4.0 EXPO catheter  and positioned in the vessel ostia under fluoroscopic guidance.  Angiography was performed in multiple projections using hand-injection of  contrast. Right coronary artery angiography. The vessel was injected  utilizing a 5FR FR4.0 EXPO catheter and positioned in the vessel ostia  under fluoroscopic guidance. Angiography was performed in multiple  projections using hand-injection of contrast. Graft angiography. The  vessel was injected utilizing a 5FR FR4.0 EXPO catheter and positioned in  the vessel ostia under fluoroscopic guidance. Angiography was performed in  multiple projections using hand-injection of contrast. RADIATION EXPOSURE:  5.5 min.  CONTRAST GIVEN: Omnipaque 28 ml.  VENTRICLES: EF estimated was 55 %.  CORONARY VESSELS: The coronary circulation is left dominant.  LM:   --  LM: Normal.  LAD:   --  Proximal LAD: There was a 100 % stenosis.  --  D1: There was a tubular 25 % stenosis at the site of a prior  angioplasty with stent placement.  CX:   --  LPDA: There was a 100 % stenosis.  RCA:   --  Mid RCA: There was a 100 % stenosis.  GRAFTS:   --  Graft to the mid LAD: The graft was a normal sized LIMA.  Distal vessel angiography showed mild diffuse disease.  --  Graft to the LPDA: The graft was a normal sized saphenous vein graft  from the aorta. Graft angiography showed mild atherosclerosis. Distal  vessel angiography showed mild diffuse disease.  COMPLICATIONS: There were no complications.  DIAGNOSTIC RECOMMENDATIONS: Medical management is recommended.  Prepared and signed by  Shanti Bowie M.D.  Signed 2017 18:16:49  HEMODYNAMIC TABLES  Pressures:  Baseline/ Room Air  Pressures:  - HR: 69  Pressures:  - Rhythm:  Pressures:  -- Aortic Pressure (S/D/M): 113/64/84  Pressures:  -- Left Ventricle (s/edp): 118/12/--  Outputs:  Baseline/ Room Air  Outputs:  -- CALCULATIONS[age]  Outputs:  -- CALCULATIONS: Body Surface Area: 1.78  Outputs:  -- CALCULATIONS: Height in cm: 167.00  Outputs:  -- CALCULATIONS: Sex: Male  Outputs:  -- CALCULATIONS: Weight in k.70    < end of copied text >      < from: CT Angio Chest w/ IV Cont (19 @ 21:39) >  EXAM:  CT ANGIO CHEST (W)AW IC        PROCEDURE DATE:  2019         INTERPRETATION:  CLINICAL INFORMATION: Dyspnea. Evaluate for pulmonary   embolus    COMPARISON: None.    PROCEDURE:   CT Angiography of the Chest.  90 ml of Omnipaque 350 was injected intravenously. 10 ml were discarded.  Sagittal and coronal reformats were performed as well as 3D (MIP)   reconstructions.      FINDINGS:    LUNGS AND AIRWAYS: Patent central airways.  Punctate left apical nodular   opacities likely distal mucus impaction. Bilateral parenchymal scarring.    PLEURA: No pleural effusion.    MEDIASTINUM AND CECY: No lymphadenopathy.    VESSELS: Poor bolus opacification and respiratory motion limits   evaluation for pulmonary embolus. No central or lobar pulmonary embolus.   Evaluation of the segmental and subsegmental pulmonary arteries is   nondiagnostic. Normal left-sided 3 vessel aortic arch.    HEART: Heart size is normal. No pericardial effusion.    CHEST WALL AND LOWER NECK: Status post midline sternotomy    VISUALIZED UPPER ABDOMEN: 2 cm heterogeneous left adrenal nodule. The   gallbladder is surgically absent. 1.7 cm indeterminate left renal lesion.    BONES: There is multilevel thoracic spondylosis.    IMPRESSION:     No central or lobar acute pulmonary arterial embolus. Nondiagnostic study   for acute pulmonary arterial embolus of the segmental and subsegmental   pulmonary arteries.    Indeterminate 2 cm left adrenal lesion.    1.7 cm indeterminate left renal lesion.      CR PENN M.D., RADIOLOGY RESIDENT  This document has been electronically signed.  AMERICA BULL M.D., ATTENDING RADIOLOGIST  This document has been electronically signed. 2019 12:10AM      < from: Transthoracic Echocardiogram (19 @ 09:52) >    Patient name: Ángel Garza  YOB: 1934   Age: 85 (M)   MR#: [id number]  Study Date: 2019  Location: [facility] Sonographer: Lauren Finkelstein, [facility]  Study quality: Technically good  Referring Physician: Edwar Wilkins MD  Blood Pressure: 102/61 mmHg  Height: 167 cm  Weight: 67 kg  BSA: 1.8 m2  ------------------------------------------------------------------------  PROCEDURE: Transthoracic echocardiogram with 2-D, M-Mode  and complete spectral and color flow Doppler.  INDICATION: Abnormal electrocardiogram (ECG) (EKG)  (R94.31), Shortness of breath (R06.02)  ------------------------------------------------------------------------  DIMENSIONS:  Dimensions:     Normal Values:  LA:     4.3 cm    2.0 - 4.0 cm  Ao:     3.0 cm    2.0- 3.8 cm  SEPTUM: 0.8 cm    0.6 - 1.2 cm  PWT:    0.8 cm    0.6 - 1.1 cm  LVIDd:  3.8 cm    3.0 - 5.6 cm  LVIDs:  2.5 cm    1.8 - 4.0 cm  Derived Variables:  LVMI: 49 g/m2  RWT: 0.42  Fractional short: 34 %  Ejection Fraction (Teicholtz): 64 %  ------------------------------------------------------------------------  OBSERVATIONS:  Mitral Valve: Mitral annular calcification, otherwise  normal mitral valve. Mild mitral regurgitation.  Aortic Root: Normal aortic root.  Aortic Valve: Calcified trileaflet aortic valve with normal  opening. Mild-moderate aortic regurgitation.  Left Atrium: Normal left atrium.  LA volume index = 24  cc/m2.  Left Ventricle: Normal left ventricular systolic function.  No segmental wall motion abnormalities. Normal left  ventricular internal dimensions and wall thicknesses.  Right Heart: Normal right atrium. The right ventricle is  not well visualized; grossly normal right ventricular  systolic function. Normal tricuspid valve.  Mild tricuspid  regurgitation. Normal pulmonic valve.  Pericardium/PleuraNormal pericardium with no pericardial  effusion.  ------------------------------------------------------------------------  CONCLUSIONS:  1. Mitral annular calcification, otherwise normal mitral  valve. Mild mitral regurgitation.  2. Calcified trileaflet aortic valve with normal opening.  Mild-moderate aortic regurgitation.  3. Normal left ventricular internal dimensions and wall  thicknesses.  4. Normal left ventricular systolic function. No segmental  wall motion abnormalities.  5. The right ventricle is not well visualized; grossly  normal right ventricular systolic function.  6. Normal tricuspid valve.  Mild tricuspid regurgitation.  ------------------------------------------------------------------------  Confirmed on  2019 - 14:00:20 by CORINA Leiva  ------------------------------------------------------------------------    < end of copied text > Body Location Override (Optional - Billing Will Still Be Based On Selected Body Map Location If Applicable): right upper forehead Detail Level: Detailed Add 83243 Cpt? (Important Note: In 2017 The Use Of 07540 Is Being Tracked By Cms To Determine Future Global Period Reimbursement For Global Periods): no

## 2022-02-16 NOTE — ED ADULT NURSE NOTE - PAIN RATING/NUMBER SCALE (0-10): REST

## 2023-02-27 NOTE — PROGRESS NOTE ADULT - PROBLEM/PLAN-4
DISPLAY PLAN FREE TEXT Picato Pregnancy And Lactation Text: This medication is Pregnancy Category C. It is unknown if this medication is excreted in breast milk.
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
## 2024-02-26 NOTE — PHYSICAL EXAM
Form received at [facility] on 2/26/2024.   Please note that it takes 7-10 business days for completion.  Auth received with form.    [Normal Appearance] : normal appearance [Normal Conjunctiva] : the conjunctiva exhibited no abnormalities [Well Groomed] : well groomed [General Appearance - In No Acute Distress] : no acute distress [Neck Appearance] : the appearance of the neck was normal [Normal Oropharynx] : normal oropharynx [Heart Sounds] : normal S1 and S2 [Heart Rate And Rhythm] : heart rate and rhythm were normal [Murmurs] : no murmurs present [Arterial Pulses Normal] : the arterial pulses were normal [Auscultation Breath Sounds / Voice Sounds] : lungs were clear to auscultation bilaterally [Respiration, Rhythm And Depth] : normal respiratory rhythm and effort [Exaggerated Use Of Accessory Muscles For Inspiration] : no accessory muscle use [Nail Clubbing] : no clubbing of the fingernails [Abnormal Walk] : normal gait [] : no rash [Cyanosis, Localized] : no localized cyanosis [No Focal Deficits] : no focal deficits [Affect] : the affect was normal [Mood] : the mood was normal [FreeTextEntry2] : none
